# Patient Record
Sex: MALE | Race: WHITE | NOT HISPANIC OR LATINO | Employment: OTHER | URBAN - METROPOLITAN AREA
[De-identification: names, ages, dates, MRNs, and addresses within clinical notes are randomized per-mention and may not be internally consistent; named-entity substitution may affect disease eponyms.]

---

## 2019-02-05 ENCOUNTER — APPOINTMENT (OUTPATIENT)
Dept: RADIOLOGY | Facility: CLINIC | Age: 59
End: 2019-02-05
Payer: COMMERCIAL

## 2019-02-05 VITALS
WEIGHT: 250 LBS | HEIGHT: 73 IN | RESPIRATION RATE: 16 BRPM | DIASTOLIC BLOOD PRESSURE: 91 MMHG | HEART RATE: 99 BPM | SYSTOLIC BLOOD PRESSURE: 135 MMHG | BODY MASS INDEX: 33.13 KG/M2

## 2019-02-05 DIAGNOSIS — S82.141A CLOSED FRACTURE OF RIGHT TIBIAL PLATEAU, INITIAL ENCOUNTER: Primary | ICD-10-CM

## 2019-02-05 DIAGNOSIS — M25.561 ACUTE PAIN OF RIGHT KNEE: ICD-10-CM

## 2019-02-05 PROCEDURE — 99204 OFFICE O/P NEW MOD 45 MIN: CPT | Performed by: FAMILY MEDICINE

## 2019-02-05 PROCEDURE — 73564 X-RAY EXAM KNEE 4 OR MORE: CPT

## 2019-02-05 RX ORDER — ASPIRIN 325 MG
325 TABLET ORAL 2 TIMES DAILY
COMMUNITY
End: 2019-02-11 | Stop reason: ALTCHOICE

## 2019-02-05 RX ORDER — TRAMADOL HYDROCHLORIDE 50 MG/1
50 TABLET ORAL EVERY 8 HOURS PRN
Qty: 15 TABLET | Refills: 0 | Status: SHIPPED | OUTPATIENT
Start: 2019-02-05 | End: 2019-05-09 | Stop reason: ALTCHOICE

## 2019-02-05 RX ORDER — IBUPROFEN 800 MG/1
800 TABLET ORAL EVERY 8 HOURS PRN
Qty: 30 TABLET | Refills: 0 | Status: ON HOLD | OUTPATIENT
Start: 2019-02-05 | End: 2019-02-15 | Stop reason: SDUPTHER

## 2019-02-05 NOTE — PROGRESS NOTES
Assessment/Plan:  Assessment/Plan   Diagnoses and all orders for this visit:    Closed fracture of right tibial plateau, initial encounter  -     XR knee 4+ vw right injury; Future  -     CT knee right wo contrast; Future  -     Ambulatory referral to Orthopedic Surgery; Future  -     ibuprofen (MOTRIN) 800 mg tablet; Take 1 tablet (800 mg total) by mouth every 8 (eight) hours as needed for mild pain  -     traMADol (ULTRAM) 50 mg tablet; Take 1 tablet (50 mg total) by mouth every 8 (eight) hours as needed for moderate pain  -     T-Rom  Post Op Knee Brace    Other orders  -     aspirin 325 mg tablet; Take 325 mg by mouth 2 (two) times a day     50-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 left knee pain and swelling following injury while skiing on 02/05/2019  Discussed with patient physical exam, radiographs, impression and plan  X-rays of right knee noted for lateral tibial plateau split fracture with appearance of Schatzker Type I morphology with prominent gapping  Physical exam is noted for swelling about the knee as well as presence of fusion  There is tenderness of the lateral tibial plateau and lateral joint line of the knee  He has range of motion limited to extension of -20 degrees and flexion to 90°  I discussed with patient that given the extent of gapping in the knee, I would recommend evaluation and treatment by orthopedic surgeon  At this time I will place him in T ROM knee brace locked to -30 degrees extension, and he is advised to remain completely nonweightbearing  I will refer him for CT scan of the right knee to more fully determine the extent and morphology of the fracture  I will also have him follow up with orthopedic surgeon for further evaluation and treatment  He is advised to keep the leg elevated and continue with icing and alternate between ibuprofen 800 mg and tramadol for pain  Subjective:   Patient ID: Raoy Squires is a 63 y o  male    Chief Complaint   Patient presents with   [facility] Right Knee - Pain, Swelling, Numbness       45-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 presents for evaluation of right knee pain and swelling after sustaining injury while skiing 02/05/2019  He states that he lost control and process twisted his knee and landed on the anterior aspect  He had pain that was described as sudden onset, localized to the lateral aspect knee, throbbing, nonradiating, constant, and worse with bearing weight  He was assisted by another skier to his feet, and he is able to continue to ski down the rest of the slope putting most of his weight on his left lower extremity  He went home and overnight elevated lower extremity, rested, and applied ice to the knee  He states that overnight pain significantly worsened, swelling developed, and he had been unable to bear weight due to the pain and also had difficulty with fully extending and bending the knee, and pain that is worse with laying in bed and twisting  He has been using crutches from which he has had at home  He denies any previous significant injury to the right knee  Knee Pain   This is a new problem  The current episode started yesterday  The problem occurs constantly  The problem has been unchanged  Associated symptoms include arthralgias and joint swelling  Pertinent negatives include no abdominal pain, chest pain, chills, fever, numbness, rash, sore throat or weakness  The symptoms are aggravated by standing, twisting and walking  He has tried rest and ice for the symptoms  The treatment provided mild relief  The following portions of the patient's history were reviewed and updated as appropriate: He  has no past medical history on file  He  has a past surgical history that includes No past surgeries  His family history includes Cancer in his father; Kidney failure in his father; No Known Problems in his mother and sister  He  reports that he has been smoking Cigarettes  He has been smoking about 1 00 pack per day   He has never used smokeless tobacco  He reports that he drinks about 14 4 oz of alcohol per week   He reports that he does not use drugs  He has No Known Allergies       Review of Systems   Constitutional: Negative for chills and fever  HENT: Negative for sore throat  Eyes: Negative for visual disturbance  Respiratory: Negative for shortness of breath  Cardiovascular: Negative for chest pain  Gastrointestinal: Negative for abdominal pain  Genitourinary: Negative for flank pain  Musculoskeletal: Positive for arthralgias and joint swelling  Skin: Negative for rash and wound  Neurological: Negative for weakness and numbness  Hematological: Does not bruise/bleed easily  Psychiatric/Behavioral: Negative for self-injury  Objective:  Vitals:    02/05/19 1533   BP: 135/91   BP Location: Left arm   Patient Position: Sitting   Cuff Size: Large   Pulse: 99   Resp: 16   Weight: 113 kg (250 lb)   Height: 6' 1" (1 854 m)     Right Knee Exam     Tenderness   The patient is experiencing tenderness in the lateral joint line and medial joint line (Lateral tibial plateau)  Range of Motion   Extension: -30   Flexion: 90     Muscle Strength     The patient has normal right knee strength  Other   Other tests: effusion present          Observations     Right Knee   Positive for effusion  Physical Exam   Constitutional: He is oriented to person, place, and time  He appears well-developed  No distress  HENT:   Head: Normocephalic and atraumatic  Eyes: Conjunctivae are normal    Neck: No tracheal deviation present  Cardiovascular: Normal rate  Pulmonary/Chest: Effort normal  No respiratory distress  Abdominal: He exhibits no distension  Musculoskeletal:        Right knee: He exhibits effusion  Neurological: He is alert and oriented to person, place, and time  Skin: Skin is warm and dry  Psychiatric: He has a normal mood and affect   His behavior is normal    Nursing note and vitals reviewed  I have personally reviewed pertinent films in PACS and my interpretation is Right knee lateral tibial plateau split fracture with prominent gapping

## 2019-02-08 ENCOUNTER — HOSPITAL ENCOUNTER (OUTPATIENT)
Dept: CT IMAGING | Facility: HOSPITAL | Age: 59
Discharge: HOME | End: 2019-02-08
Payer: COMMERCIAL

## 2019-02-08 DIAGNOSIS — S82.141A CLOSED FRACTURE OF RIGHT TIBIAL PLATEAU, INITIAL ENCOUNTER: ICD-10-CM

## 2019-02-08 PROCEDURE — 73700 CT LOWER EXTREMITY W/O DYE: CPT

## 2019-02-11 ENCOUNTER — ANESTHESIA EVENT (OUTPATIENT)
Dept: PERIOP | Facility: HOSPITAL | Age: 59
DRG: 494 | End: 2019-02-11
Payer: COMMERCIAL

## 2019-02-11 VITALS
DIASTOLIC BLOOD PRESSURE: 84 MMHG | SYSTOLIC BLOOD PRESSURE: 134 MMHG | TEMPERATURE: 99.9 F | WEIGHT: 252 LBS | HEIGHT: 73 IN | RESPIRATION RATE: 16 BRPM | BODY MASS INDEX: 33.4 KG/M2 | HEART RATE: 102 BPM

## 2019-02-11 DIAGNOSIS — S82.141A CLOSED FRACTURE OF RIGHT TIBIAL PLATEAU, INITIAL ENCOUNTER: ICD-10-CM

## 2019-02-11 PROCEDURE — 99214 OFFICE O/P EST MOD 30 MIN: CPT | Performed by: ORTHOPAEDIC SURGERY

## 2019-02-11 RX ORDER — ACETAMINOPHEN 325 MG/1
650 TABLET ORAL EVERY 6 HOURS PRN
COMMUNITY
End: 2019-05-09 | Stop reason: ALTCHOICE

## 2019-02-11 NOTE — PROGRESS NOTES
Assessment:       closed , displaced tibial plateau fracture right proximal tibia      Plan:       I discussed the risks, benefits, options and alternatives of treatment  After a thorough discussion and, after all his questions were answered, the plan has been to proceed with surgical fixation of his tibial plateau fracture  He has requested that surgery be delayed until 02/14/2019 secondary to the impending bad weather tomorrow  I believe this is reasonable  He will be maintained in the hospital as an extended outpatient for perioperative antibiotics and analgesics  In the interim, he is to utilize the brace, which he has not been wearing for several days as he finds it in convenience  However, precautions were reviewed in regards to aggravating or worsening his injury without the use of the brace  He is to avoid weight-bearing  He was encouraged to contact me if questions or concerns arise  He is to stop taking anti-inflammatory medications, utilizing the tramadol and Tylenol for pain relief prior to surgery  Preoperative testing has been ordered as indicated  Follow up: 1 week  postoperatively        Subjective: Charlotte Medina is a 63 y o  male referred by Dr Kerri Bower for evaluation and treatment of right knee pain  Shashi injured his knee while skiing at [place] on 02/04/2019  He caught an edge, spun, was heading up hill and lost his balance, falling forward  He recalls hearing and feeling a popping sensation  He was seen, x-rays obtained and he was placed into a brace  He has not been wearing the brace because he finds it inconvenient and uncomfortable and does not provide significant benefit  He denies any history of prior injuries to his right knee that he can recall  He denies paresthesias  He has noted decrease in his swelling  He continues to have pain, but denies any significant pain  He denies any additional injuries    A CT scan was obtained and the patient is now being seen for orthopedic evaluation and treatment  Outside reports reviewed: none  The following portions of the patient's history were reviewed and updated as appropriate: allergies, current medications, past family history, past medical history, past social history, past surgical history and problem list       Objective:         General :   alert and oriented, in no acute distress   Gait: Utilizing crutches distance  The patient cannot bear weight on the injured extremity  Right Lower Extremity  Hip Palpation:  No tenderness over the greater  trochanter   Hip ROM:    Knee Effusion:  2+   Ecchymosis:  none   Knee ROM:  Right knee range of motion was possible from approximately 5° to 06°, with pain elicited by range of motion; left knee range of motion without restriction   Patella:  Nontender and without deformity   Tenderness: Lateral aspect of proximal tibia without tenderness medially   Stability:  Unable to assess stability secondary to the patient's injury, apprehension and pain   Jazmine Test:     Vikas's Test:     Sensation:   intact to light touch   Pulses: normal DP and PT pulses   The bilateral upper extremity examination and the left lower extremity examination were benign, with full range of motion, no tenderness and no deformities  HEENT exam is benign  Heart is regular  Lungs are clear  The abdomen is soft and nontender  The skin is without lacerations, abrasions or blistering, including about the right knee  The cervical, thoracic and lumbar spines are nontender  The motor and sensory exams are grossly intact throughout all 4 extremities, limited only by the patient's injury right lower extremity  Imaging  X-rays: 4 views of the knee demonstrate a lateral tibial plateau fracture, displaced and with impaction  A CT scan of the knee confirms a tibial plateau fracture with comminution and depressed articular surface  Tayler Drake

## 2019-02-11 NOTE — PRE-PROCEDURE INSTRUCTIONS
Pre-Surgery Instructions:   Medication Instructions    acetaminophen (TYLENOL) 325 mg tablet Instructed patient per Anesthesia Guidelines   ibuprofen (MOTRIN) 800 mg tablet last dose of Ibuprofen was 1/11 am; instructed not to take any preop by Dr Jhon Galan    traMADol Adaline Halt) 50 mg tablet Patient was instructed by Physician and understands  Pt was instructed by Dr Jhon Galan that he may take this  My Surgical Experience    The following information was developed to assist you to prepare for your operation  What do I need to do before coming to the hospital?   Arrange for a responsible person to drive you to and from the hospital    Arrange care for your children at home  Children are not allowed in the recovery areas of the hospital  Plan to wear clothing that is easy to put on and take off; loose fitting pants  DO NOT SMOKE DOS  Bathing  o Shower the evening before and the morning of your surgery with Dial soap per Dr Jhon Galan  Wear clean pajamas, sheets and cloths  o Remove all body piercing and jewelry  Food  o Nothing to eat or drink after midnight the night before your surgery  This includes candy and chewing gum  o Do not drink alcohol 24 hours before surgery  Medicine  o Follow instructions you received from your surgeon about which medicines you may take on the day of surgery  o If instructed to take medicine on the morning of surgery, take pills with just a small sip of water  Call your prescribing doctor for specific infroamtion on what to do if you take insulin    What should I bring to the hospital?    Bring:  Sherry Mcelroy or a walker, if you have them, for foot or knee surgery   A list of the daily medicines, vitamins, minerals, herbals and nutritional supplements you take   Include the dosages of medicines and the time you take them each day   Glasses, dentures or hearing aids   Minimal clothing; you will be wearing hospital sleepwear   Photo ID; required to verify your identity    Do not bring   Medicines or inhalers   Money, valuables or jewelry    What other information should I know about the day of surgery?  Notify your surgeons if you develop a cold, sore throat, cough, fever, rash or any other illness   Report to the Ambulatory Surgical/Same Day Surgery Unit   You will be instructed to stop at Registration only if you have not been pre-registered   Inform your  fi they do not stay that they will be asked by the staff to leave a phone number where they can be reached   Be available to be reached before surgery  In the event the operating room schedule changes, you may be asked to come in earlier or later than expected    *It is important to tell your doctor and others involved in your health care if you are taking or have been taking any non-prescription drugs, vitamins, minerals, herbals or other nutritional supplements   Any of these may interact with some food or medicines and cause a reaction

## 2019-02-11 NOTE — PATIENT INSTRUCTIONS
I have discussed the treatment options  The plan will be to proceed with surgical fixation, to be performed on 02/14/2019 at his request   He will be seen 1 week postoperatively in the office  Precautions have been reviewed prior to surgical intervention  He is also aware that recovery will take 3-6 months and there will be significant limitations postoperatively

## 2019-02-14 ENCOUNTER — ANESTHESIA (OUTPATIENT)
Dept: PERIOP | Facility: HOSPITAL | Age: 59
DRG: 494 | End: 2019-02-14
Payer: COMMERCIAL

## 2019-02-14 ENCOUNTER — HOSPITAL ENCOUNTER (INPATIENT)
Facility: HOSPITAL | Age: 59
LOS: 1 days | Discharge: HOME | DRG: 494 | End: 2019-02-15
Attending: ORTHOPAEDIC SURGERY | Admitting: ORTHOPAEDIC SURGERY
Payer: COMMERCIAL

## 2019-02-14 ENCOUNTER — APPOINTMENT (OUTPATIENT)
Dept: RADIOLOGY | Facility: HOSPITAL | Age: 59
DRG: 494 | End: 2019-02-14
Payer: COMMERCIAL

## 2019-02-14 DIAGNOSIS — S82.141D CLOSED FRACTURE OF RIGHT TIBIAL PLATEAU WITH ROUTINE HEALING: Primary | ICD-10-CM

## 2019-02-14 PROCEDURE — 97167 OT EVAL HIGH COMPLEX 60 MIN: CPT

## 2019-02-14 PROCEDURE — C1713 ANCHOR/SCREW BN/BN,TIS/BN: HCPCS | Performed by: ORTHOPAEDIC SURGERY

## 2019-02-14 PROCEDURE — 97116 GAIT TRAINING THERAPY: CPT

## 2019-02-14 PROCEDURE — 3E02340 INTRODUCTION OF INFLUENZA VACCINE INTO MUSCLE, PERCUTANEOUS APPROACH: ICD-10-PCS | Performed by: ORTHOPAEDIC SURGERY

## 2019-02-14 PROCEDURE — G8987 SELF CARE CURRENT STATUS: HCPCS

## 2019-02-14 PROCEDURE — G8979 MOBILITY GOAL STATUS: HCPCS

## 2019-02-14 PROCEDURE — 27535 TREAT KNEE FRACTURE: CPT | Performed by: ORTHOPAEDIC SURGERY

## 2019-02-14 PROCEDURE — 27535 TREAT KNEE FRACTURE: CPT | Performed by: PHYSICIAN ASSISTANT

## 2019-02-14 PROCEDURE — C1781 MESH (IMPLANTABLE): HCPCS | Performed by: ORTHOPAEDIC SURGERY

## 2019-02-14 PROCEDURE — 73560 X-RAY EXAM OF KNEE 1 OR 2: CPT

## 2019-02-14 PROCEDURE — G8978 MOBILITY CURRENT STATUS: HCPCS

## 2019-02-14 PROCEDURE — 97162 PT EVAL MOD COMPLEX 30 MIN: CPT

## 2019-02-14 PROCEDURE — 0QSG04Z REPOSITION RIGHT TIBIA WITH INTERNAL FIXATION DEVICE, OPEN APPROACH: ICD-10-PCS | Performed by: ORTHOPAEDIC SURGERY

## 2019-02-14 PROCEDURE — G8988 SELF CARE GOAL STATUS: HCPCS

## 2019-02-14 DEVICE — 3.5MM CORTEX SCREW SELF-TAPPING 30MM: Type: IMPLANTABLE DEVICE | Site: KNEE | Status: FUNCTIONAL

## 2019-02-14 DEVICE — 3.5MM CORTEX SCREW SELF-TAPPING 28MM: Type: IMPLANTABLE DEVICE | Site: KNEE | Status: FUNCTIONAL

## 2019-02-14 DEVICE — IMPLANTABLE DEVICE: Type: IMPLANTABLE DEVICE | Site: KNEE | Status: FUNCTIONAL

## 2019-02-14 DEVICE — 3.5MM VARIABLE ANGLE LOCKING SCREW/SLF-TPNG/STRDRV/85MM: Type: IMPLANTABLE DEVICE | Status: FUNCTIONAL

## 2019-02-14 DEVICE — 3.5MM VARIABLE ANGLE LOCKING SCREW/SLF-TPNG/STRDRV/80MM: Type: IMPLANTABLE DEVICE | Status: FUNCTIONAL

## 2019-02-14 DEVICE — 3.5MM CORTEX SCREW SELF-TAPPING 34MM: Type: IMPLANTABLE DEVICE | Site: KNEE | Status: FUNCTIONAL

## 2019-02-14 DEVICE — 3.5MM VA-LCP PROX TIBIA PLATE SMALL BEND/6H/117MM/RIGHT
Type: IMPLANTABLE DEVICE | Site: KNEE | Status: FUNCTIONAL
Brand: VA-LCP

## 2019-02-14 DEVICE — DBX PUTTY, 5CC
Type: IMPLANTABLE DEVICE | Site: KNEE | Status: FUNCTIONAL
Brand: DBX®

## 2019-02-14 RX ORDER — ACETAMINOPHEN 325 MG/1
650 TABLET ORAL EVERY 6 HOURS PRN
Status: DISCONTINUED | OUTPATIENT
Start: 2019-02-14 | End: 2019-02-15 | Stop reason: HOSPADM

## 2019-02-14 RX ORDER — KETOROLAC TROMETHAMINE 30 MG/ML
30 INJECTION, SOLUTION INTRAMUSCULAR; INTRAVENOUS ONCE AS NEEDED
Status: DISCONTINUED | OUTPATIENT
Start: 2019-02-14 | End: 2019-02-14 | Stop reason: HOSPADM

## 2019-02-14 RX ORDER — SODIUM CHLORIDE, SODIUM LACTATE, POTASSIUM CHLORIDE, CALCIUM CHLORIDE 600; 310; 30; 20 MG/100ML; MG/100ML; MG/100ML; MG/100ML
100 INJECTION, SOLUTION INTRAVENOUS CONTINUOUS
Status: DISCONTINUED | OUTPATIENT
Start: 2019-02-14 | End: 2019-02-14

## 2019-02-14 RX ORDER — ASPIRIN 325 MG
325 TABLET ORAL 2 TIMES DAILY
Status: DISCONTINUED | OUTPATIENT
Start: 2019-02-14 | End: 2019-02-15 | Stop reason: HOSPADM

## 2019-02-14 RX ORDER — SODIUM CHLORIDE, SODIUM LACTATE, POTASSIUM CHLORIDE, CALCIUM CHLORIDE 600; 310; 30; 20 MG/100ML; MG/100ML; MG/100ML; MG/100ML
INJECTION, SOLUTION INTRAVENOUS CONTINUOUS PRN
Status: DISCONTINUED | OUTPATIENT
Start: 2019-02-14 | End: 2019-02-14

## 2019-02-14 RX ORDER — ONDANSETRON 2 MG/ML
4 INJECTION INTRAMUSCULAR; INTRAVENOUS EVERY 8 HOURS PRN
Status: DISCONTINUED | OUTPATIENT
Start: 2019-02-14 | End: 2019-02-15 | Stop reason: HOSPADM

## 2019-02-14 RX ORDER — SODIUM CHLORIDE, SODIUM LACTATE, POTASSIUM CHLORIDE, CALCIUM CHLORIDE 600; 310; 30; 20 MG/100ML; MG/100ML; MG/100ML; MG/100ML
125 INJECTION, SOLUTION INTRAVENOUS CONTINUOUS
Status: DISCONTINUED | OUTPATIENT
Start: 2019-02-14 | End: 2019-02-15 | Stop reason: HOSPADM

## 2019-02-14 RX ORDER — ROPIVACAINE HYDROCHLORIDE 5 MG/ML
INJECTION, SOLUTION EPIDURAL; INFILTRATION; PERINEURAL
Status: COMPLETED | OUTPATIENT
Start: 2019-02-14 | End: 2019-02-14

## 2019-02-14 RX ORDER — METOCLOPRAMIDE HYDROCHLORIDE 5 MG/ML
10 INJECTION INTRAMUSCULAR; INTRAVENOUS ONCE AS NEEDED
Status: DISCONTINUED | OUTPATIENT
Start: 2019-02-14 | End: 2019-02-14 | Stop reason: HOSPADM

## 2019-02-14 RX ORDER — PROPOFOL 10 MG/ML
INJECTION, EMULSION INTRAVENOUS AS NEEDED
Status: DISCONTINUED | OUTPATIENT
Start: 2019-02-14 | End: 2019-02-14 | Stop reason: SURG

## 2019-02-14 RX ORDER — ROPIVACAINE HYDROCHLORIDE 5 MG/ML
INJECTION, SOLUTION EPIDURAL; INFILTRATION; PERINEURAL AS NEEDED
Status: DISCONTINUED | OUTPATIENT
Start: 2019-02-14 | End: 2019-02-14 | Stop reason: SURG

## 2019-02-14 RX ORDER — CEFAZOLIN SODIUM 2 G/50ML
SOLUTION INTRAVENOUS AS NEEDED
Status: DISCONTINUED | OUTPATIENT
Start: 2019-02-14 | End: 2019-02-14 | Stop reason: SURG

## 2019-02-14 RX ORDER — ONDANSETRON 2 MG/ML
4 INJECTION INTRAMUSCULAR; INTRAVENOUS ONCE AS NEEDED
Status: DISCONTINUED | OUTPATIENT
Start: 2019-02-14 | End: 2019-02-14 | Stop reason: HOSPADM

## 2019-02-14 RX ORDER — TRAMADOL HYDROCHLORIDE 50 MG/1
100 TABLET ORAL EVERY 6 HOURS SCHEDULED
Status: DISCONTINUED | OUTPATIENT
Start: 2019-02-14 | End: 2019-02-15 | Stop reason: HOSPADM

## 2019-02-14 RX ORDER — MIDAZOLAM HYDROCHLORIDE 1 MG/ML
INJECTION INTRAMUSCULAR; INTRAVENOUS AS NEEDED
Status: DISCONTINUED | OUTPATIENT
Start: 2019-02-14 | End: 2019-02-14 | Stop reason: SURG

## 2019-02-14 RX ORDER — HYDROMORPHONE HCL/PF 1 MG/ML
0.5 SYRINGE (ML) INJECTION
Status: DISCONTINUED | OUTPATIENT
Start: 2019-02-14 | End: 2019-02-14 | Stop reason: HOSPADM

## 2019-02-14 RX ORDER — DOCUSATE SODIUM 100 MG/1
100 CAPSULE, LIQUID FILLED ORAL 2 TIMES DAILY
Status: DISCONTINUED | OUTPATIENT
Start: 2019-02-14 | End: 2019-02-15 | Stop reason: HOSPADM

## 2019-02-14 RX ORDER — KETOROLAC TROMETHAMINE 30 MG/ML
INJECTION, SOLUTION INTRAMUSCULAR; INTRAVENOUS AS NEEDED
Status: DISCONTINUED | OUTPATIENT
Start: 2019-02-14 | End: 2019-02-14 | Stop reason: SURG

## 2019-02-14 RX ORDER — CEFAZOLIN SODIUM 2 G/50ML
2000 SOLUTION INTRAVENOUS EVERY 8 HOURS
Status: COMPLETED | OUTPATIENT
Start: 2019-02-14 | End: 2019-02-15

## 2019-02-14 RX ORDER — MORPHINE SULFATE 4 MG/ML
2 INJECTION, SOLUTION INTRAMUSCULAR; INTRAVENOUS
Status: DISCONTINUED | OUTPATIENT
Start: 2019-02-14 | End: 2019-02-14 | Stop reason: HOSPADM

## 2019-02-14 RX ORDER — FENTANYL CITRATE 50 UG/ML
INJECTION, SOLUTION INTRAMUSCULAR; INTRAVENOUS AS NEEDED
Status: DISCONTINUED | OUTPATIENT
Start: 2019-02-14 | End: 2019-02-14 | Stop reason: SURG

## 2019-02-14 RX ORDER — TRAMADOL HYDROCHLORIDE 50 MG/1
50 TABLET ORAL EVERY 8 HOURS PRN
Status: DISCONTINUED | OUTPATIENT
Start: 2019-02-14 | End: 2019-02-15 | Stop reason: HOSPADM

## 2019-02-14 RX ORDER — MEPERIDINE HYDROCHLORIDE 50 MG/ML
12.5 INJECTION INTRAMUSCULAR; INTRAVENOUS; SUBCUTANEOUS
Status: DISCONTINUED | OUTPATIENT
Start: 2019-02-14 | End: 2019-02-14 | Stop reason: HOSPADM

## 2019-02-14 RX ADMIN — HYDROMORPHONE HYDROCHLORIDE 0.5 MG: 1 INJECTION, SOLUTION INTRAMUSCULAR; INTRAVENOUS; SUBCUTANEOUS at 11:18

## 2019-02-14 RX ADMIN — MIDAZOLAM HYDROCHLORIDE 2 MG: 1 INJECTION, SOLUTION INTRAMUSCULAR; INTRAVENOUS at 07:53

## 2019-02-14 RX ADMIN — SODIUM CHLORIDE, POTASSIUM CHLORIDE, SODIUM LACTATE AND CALCIUM CHLORIDE 100 ML/HR: 600; 310; 30; 20 INJECTION, SOLUTION INTRAVENOUS at 12:52

## 2019-02-14 RX ADMIN — TRAMADOL HYDROCHLORIDE 100 MG: 50 TABLET, COATED ORAL at 17:38

## 2019-02-14 RX ADMIN — TRAMADOL HYDROCHLORIDE 100 MG: 50 TABLET, COATED ORAL at 23:38

## 2019-02-14 RX ADMIN — LIDOCAINE HYDROCHLORIDE 100 MG: 20 INJECTION, SOLUTION INTRAVENOUS at 08:41

## 2019-02-14 RX ADMIN — DEXAMETHASONE SODIUM PHOSPHATE 10 MG: 10 INJECTION INTRAMUSCULAR; INTRAVENOUS at 08:02

## 2019-02-14 RX ADMIN — CEFAZOLIN SODIUM 2000 MG: 2 SOLUTION INTRAVENOUS at 14:57

## 2019-02-14 RX ADMIN — ASPIRIN 325 MG: 325 TABLET, FILM COATED ORAL at 20:57

## 2019-02-14 RX ADMIN — TRAMADOL HYDROCHLORIDE 100 MG: 50 TABLET, COATED ORAL at 12:46

## 2019-02-14 RX ADMIN — DOCUSATE SODIUM 100 MG: 100 CAPSULE, LIQUID FILLED ORAL at 12:45

## 2019-02-14 RX ADMIN — FENTANYL CITRATE 50 MCG: 50 INJECTION INTRAMUSCULAR; INTRAVENOUS at 09:00

## 2019-02-14 RX ADMIN — SODIUM CHLORIDE, POTASSIUM CHLORIDE, SODIUM LACTATE AND CALCIUM CHLORIDE: 600; 310; 30; 20 INJECTION, SOLUTION INTRAVENOUS at 09:10

## 2019-02-14 RX ADMIN — FENTANYL CITRATE 50 MCG: 50 INJECTION INTRAMUSCULAR; INTRAVENOUS at 09:07

## 2019-02-14 RX ADMIN — ROPIVACAINE HYDROCHLORIDE 20 ML: 5 INJECTION, SOLUTION EPIDURAL; INFILTRATION; PERINEURAL at 08:02

## 2019-02-14 RX ADMIN — SODIUM CHLORIDE, POTASSIUM CHLORIDE, SODIUM LACTATE AND CALCIUM CHLORIDE 125 ML/HR: 600; 310; 30; 20 INJECTION, SOLUTION INTRAVENOUS at 07:00

## 2019-02-14 RX ADMIN — CEFAZOLIN SODIUM 2000 MG: 2 SOLUTION INTRAVENOUS at 08:57

## 2019-02-14 RX ADMIN — HYDROMORPHONE HYDROCHLORIDE 0.5 MG: 1 INJECTION, SOLUTION INTRAMUSCULAR; INTRAVENOUS; SUBCUTANEOUS at 10:38

## 2019-02-14 RX ADMIN — PROPOFOL 200 MG: 10 INJECTION, EMULSION INTRAVENOUS at 08:41

## 2019-02-14 RX ADMIN — FENTANYL CITRATE 50 MCG: 50 INJECTION INTRAMUSCULAR; INTRAVENOUS at 07:53

## 2019-02-14 RX ADMIN — HYDROMORPHONE HYDROCHLORIDE 0.5 MG: 1 INJECTION, SOLUTION INTRAMUSCULAR; INTRAVENOUS; SUBCUTANEOUS at 10:55

## 2019-02-14 RX ADMIN — FENTANYL CITRATE 50 MCG: 50 INJECTION INTRAMUSCULAR; INTRAVENOUS at 07:57

## 2019-02-14 RX ADMIN — ASPIRIN 325 MG: 325 TABLET, FILM COATED ORAL at 12:46

## 2019-02-14 RX ADMIN — KETOROLAC TROMETHAMINE 30 MG: 30 INJECTION, SOLUTION INTRAMUSCULAR at 10:13

## 2019-02-14 RX ADMIN — CEFAZOLIN SODIUM 2000 MG: 2 SOLUTION INTRAVENOUS at 21:03

## 2019-02-14 NOTE — OP NOTE
OPERATIVE REPORT  PATIENT NAME: Farheen De Leon    :  1960  MRN: [id number]  Pt Location: [facility] OR ROOM 03    SURGERY DATE: 2019    Surgeon(s) and Role:     * Ralph Torres - Primary    Assist:   Vladimir Fiore    The PA was required for this procedure to assist in fracture manipulation, reduction of the fracture, retraction of soft tissues  There was no resident available for the procedure  Preop Diagnosis:  Closed fracture of right tibial plateau, initial encounter [S82 141A]    * No post-op diagnosis entered *    Post-Op Diagnosis Codes:     * Closed fracture of right tibial plateau, initial encounter [S82 141A]    Procedure(s) (LRB):  TIBAL PLATEAU OPEN REDUCTION W/ INTERNAL FIXATION (ORIF) (Right)    Specimen(s):  * No specimens in log *    Estimated Blood Loss:   Minimal    Anesthesia Type:   General with 80 Dr  Albino Maul block    Tourniquet Time:  82 minutes at 300 mm of mercury    Operative Indications:  Closed fracture of right tibial plateau, initial encounter Ramiro Biggs is a 81-year-old male who injured his right lower extremity skiing and was seen in the emergency room reveals evaluated, x-rays were obtained  He was then seen by Orthopedics, a CT scan of noted and the patient was seen by myself for surgical discussion and review of his x-rays and CT scan on 2019  The risks, benefits, options and alternatives of treatment were discussed it was elected to proceed with surgical fixation  Operative Findings: There was a slightly comminuted, depressed lateral tibial plateau fracture which was stably fixated in standard technique with good restoration of the articular surface alignment and stable fixation  Fluoroscopic images in AP and lateral plane, the conclusion the procedure, demonstrated good alignment and stable fixation      Complications:   None    Procedure and Technique:  The patient was administered adductor canal block in the holding area and then taken to the operating room  A general anesthetic was administered and a well-padded tourniquet was applied to the proximal right thigh  A preoperative time-out was performed, preoperative antibiotics administered and the right lower extremity prepped and draped in standard fashion  The limb was then elevated, exsanguinated with an Esmarch bandage and the tourniquet inflated to 300 mm of mercury  A midline incision was performed from the proximal portion of the patella distally to a portion on the anterior tibial crest distal to the fracture to allow adequate access  Sharp dissection was carried down through the skin and subcutaneous tissues  The lateral soft tissues were elevated to allow exposure of the anterior compartment muscles  These were divided from posterior to anterior at approximately the level of the joint line and then curved distally over the anterior aspect of the tibia and then the tibia exposed subperiosteally along the tibial shaft  Careful subperiosteal exposure of the proximal tibia was then performed in the fracture site identified, mobilized and cleansed of debris  The fracture was inspected and the articular fragment, noted on CT scan, was identified and then gently placed proximally  An 18 gauge spinal needle was inserted into the joint and the fracture then reduced, re-establishing a reasonable joint line  Guide pins were placed across the fracture to stabilize and then a 4 hole proximal lateral tibial plate was placed and secured in position with guide pins  The fracture was noted to be in good position and at this time, the guide pins in the plate were removed the fracture again mobilized and utilizing fluoroscopic guidance, the articular surfaces placed in reasonable position and then bone graft placed at the fracture site, supporting the articular surface    The plate was then repositioned, secured with guide pins and a bone-holding clamp utilized to secure the device and the fracture in place  Fluoroscopic images demonstrated good alignment with a slight depression of the articular surface but within no more than 2 mm of anatomic alignment  Additional attempts at correction were not successful and it was felt that this was an adequate position  The plate was initially secured with a distal cortical screw and then the subarticular screws placed in standard fashion utilizing locking screws  Fluoroscopic images demonstrated excellent fracture alignment  Two additional cortical screws were placed distally  AP and lateral fluoroscopic images demonstrated excellent fracture alignment and stable fixation  Wound was thoroughly irrigated with copious amounts of saline solution  The anterior compartment fascia reapproximated loosely with 0 Vicryl  Subcutaneous tissues were approximated with 2 O Vicryl and the skin secured with skin glue  Dressings were then applied consisting of dry Telfa, gauze and Webril  The tourniquet was deflated after total tourniquet time of 82 minutes  The knee was wrapped with Ace bandages and then a knee range of motion brace applied along range of motion from 30-70 degrees  Patient was then awakened from the general anesthetic and transferred to recovery room in stable and satisfactory postoperative condition     I was present for the entire procedure    Patient Disposition:  PACU     SIGNATURE: Christo Herzog  DATE: February 14, 2019  TIME: 10:35 AM

## 2019-02-14 NOTE — PLAN OF CARE
Problem: PHYSICAL THERAPY ADULT  Goal: Performs mobility at highest level of function for planned discharge setting  See evaluation for individualized goals  Description  Treatment/Interventions: Functional transfer training, Elevations, Therapeutic exercise, Gait training, Patient/family training, Equipment eval/education, Spoke to nursing, Spoke to case management  Equipment Recommended: Crutches(continue use of crutches)       See flowsheet documentation for full assessment, interventions and recommendations  Note:   Prognosis: Excellent  Problem List: Decreased strength, Impaired balance, Orthopedic restrictions, Pain, Decreased skin integrity  Assessment: Pt is 63 y o  male seen for PT evaluation on 2/14/2019 s/p admit to [address] [place] [address] on 2/14/2019 w/ Closed fracture of right tibial plateau with routine healing  Pt is POD #0 tibial plateau ORIF  PT consulted to assess pt's functional mobility and d/c needs  Order placed for PT eval and tx, w/ up as tolerated order, NWB R LE w/ hinged knee brace donned upon arrival  Performed at least 2 patient identifiers during session: Name and wristband  Comorbidities affecting pt's physical performance at time of assessment include: smoker, obesity  PTA, pt was independent w/ all functional mobility w/ no AD at baseline (recent use of crutches following skiing accident), ambulates unrestricted distances and all terrain, has 6 SARAH, lives w/ spouse in 1 level home and retired  Personal factors affecting pt at time of IE include: ambulating w/ assistive device and stairs to enter home  Please find objective findings from PT assessment regarding body systems outlined above with impairments and limitations including impaired balance, gait deviations, fall risk and orthopedic restrictions, as well as mobility assessment (need for supervision level of assist w/ all phases of mobility when usually ambulating independently)   The following objective measures performed on IE also reveal limitations: Barthel Index: 65/100  Pt's clinical presentation is currently evolving  Pt to benefit from continued PT tx to address deficits as defined above and maximize level of functional independent mobility and consistency  From PT/mobility standpoint, recommendation at time of d/c would be OP PT & home w/ family support, pending progress in order to facilitate return to PLOF  Barriers to Discharge: None     Recommendation: Outpatient PT, Home with family support     PT - OK to Discharge: No    See flowsheet documentation for full assessment

## 2019-02-14 NOTE — PLAN OF CARE
Pt admitted to the Med Surg floor A+Ox4, denies chest pain and SOB, palpable right pedal pulse, immobilizer and ice in place, extremity elevated  Pain is tolerable for the patient  Oriented to the room and the use of the call bell  Will continue to monitor

## 2019-02-14 NOTE — PHYSICAL THERAPY NOTE
Physical Therapy Evaluation     Patient's Name: Moni Broussard    Admitting Diagnosis  Closed fracture of right tibial plateau, initial encounter [S82 141A]    Problem List  Patient Active Problem List   Diagnosis    Closed fracture of right tibial plateau with routine healing       Past Medical History  Past Medical History:   Diagnosis Date    Fractures        Past Surgical History  Past Surgical History:   Procedure Laterality Date    NO PAST SURGERIES            02/14/19 1307   Note Type   Note type Eval/Treat   Pain Assessment   Pain Assessment No/denies pain  (at rest)   Pain Score No Pain   Home Living   Type of [address] [place] [address] One level;Performs ADLs on one level; Able to live on main level with bedroom/bathroom;Stairs to enter with rails  (6 SARAH w/ U/L HR)   Bathroom Shower/Tub Tub/shower unit   Bathroom Toilet Standard   Bathroom Equipment Hand-held shower   [address]  (only using axillary crutches since injury)   Prior Function   Level of [place] Independent with ADLs and functional mobility   Lives With Spouse   Receives Help From Family   ADL Assistance Independent   IADLs Independent   Falls in the last 6 months 1 to 4   Vocational Retired   Restrictions/Precautions   [place] Bearing Precautions Per Order Yes   RLE [place] Bearing Per Order NWB   Braces or Orthoses LE Braces  (hinged knee brace)   Other Precautions Multiple lines; Fall Risk;Pain;WBS   General   Family/Caregiver Present Yes  (spouse initially)   Cognition   Overall Cognitive Status WFL   Arousal/Participation Alert   Orientation Level Oriented X4   Memory Within functional limits   Following Commands Follows all commands and directions without difficulty   Comments pt agreeable to PT session   RUE Assessment   RUE Assessment WFL   LUE Assessment   LUE Assessment WFL   RLE Assessment   RLE Assessment X   Strength RLE   R Hip Flexion 3+/5   R Knee Extension   (unable to assess)   R Ankle Dorsiflexion 3/5   LLE Assessment   LLE Assessment WFL  (5/5)   Coordination   Movements are Fluid and Coordinated 1   Sensation WFL   Light Touch   RLE Light Touch Grossly intact   LLE Light Touch Grossly intact   Bed Mobility   Supine to Sit 6  Modified independent   Additional items HOB elevated; Bedrails   Sit to Supine 6  Modified independent   Additional items HOB elevated; Bedrails   Transfers   Sit to Stand 5  Supervision   Additional items Assist x 1; Increased time required   Stand to Sit 5  Supervision   Additional items Assist x 1;Verbal cues   Additional Comments pt compliant 100% of the time maintaining NWB on R LE   Ambulation/Elevation   Gait pattern Step to;Excessively slow  (hop to w/ L LE)   Gait Assistance 5  Supervision   Additional items Assist x 1;Verbal cues   Assistive Device Rolling walker   Distance 100'   Stair Management Assistance Not tested   Balance   Static Sitting Normal   Dynamic Sitting Normal   Static Standing Good   Dynamic Standing Good   Ambulatory Good  (w/ RW)   Endurance Deficit   Endurance Deficit No   Activity Tolerance   Activity Tolerance Patient tolerated treatment well   Medical Staff Made Aware pt w/ up as tolerated order   Nurse Made Aware Yes, RN Naz Mckeon verbalized pt appropriate for PT session, made aware of outcomes/recs   Assessment   Prognosis Excellent   Problem List Decreased strength; Impaired balance;Orthopedic restrictions;Pain;Decreased skin integrity   Assessment Pt is 63 y o  male seen for PT evaluation on 2/14/2019 s/p admit to [facility] on 2/14/2019 w/ Closed fracture of right tibial plateau with routine healing  Pt is POD #0 tibial plateau ORIF  PT consulted to assess pt's functional mobility and d/c needs  Order placed for PT eval and tx, w/ up as tolerated order, NWB R LE w/ hinged knee brace donned upon arrival  Performed at least 2 patient identifiers during session: Name and wristband   Comorbidities affecting pt's physical performance at time of assessment include: smoker, obesity  PTA, pt was independent w/ all functional mobility w/ no AD at baseline (recent use of crutches following skiing accident), ambulates unrestricted distances and all terrain, has 6 SARAH, lives w/ spouse in 1 level home and retired  Personal factors affecting pt at time of IE include: ambulating w/ assistive device and stairs to enter home  Please find objective findings from PT assessment regarding body systems outlined above with impairments and limitations including impaired balance, gait deviations, fall risk and orthopedic restrictions, as well as mobility assessment (need for supervision level of assist w/ all phases of mobility when usually ambulating independently)  The following objective measures performed on IE also reveal limitations: Barthel Index: 65/100  Pt's clinical presentation is currently evolving  Pt to benefit from continued PT tx to address deficits as defined above and maximize level of functional independent mobility and consistency  From PT/mobility standpoint, recommendation at time of d/c would be OP PT & home w/ family support, pending progress in order to facilitate return to PLOF  Barriers to Discharge None   Goals   Patient Goals "to return home"   [facility] Expiration Date 02/19/19   Short Term Goal #1 In 3-5 days: Increase R LE strength 1/2 grade to facilitate independent mobility, Perform all transfers independently to improve independence, Ambulate > 250 ft  with least restrictive assistive device modified independent w/o LOB and w/ normalized gait pattern 100% of the time, Navigate 6 stairs modified independent with unilateral handrail to facilitate return to previous living environment, Increase all balance 1/2 grade to decrease risk for falls and Tolerate 4 hr OOB to faciliate upright tolerance   Treatment Day 1   Plan   Treatment/Interventions Functional transfer training;Elevations; Therapeutic exercise;Gait training;Patient/family training;Equipment eval/education;Spoke to nursing;Spoke to case management   PT Frequency 7x/wk   Recommendation   Recommendation Outpatient PT; Home with family support   Equipment Recommended Crutches  (continue use of crutches)   PT - OK to Discharge No   Additional Comments pt to clear steps prior to d/c home   Barthel Index   Feeding 10   Bathing 5   Grooming Score 5   Dressing Score 5   Bladder Score 10   Bowels Score 10   Toilet Use Score 10   Transfers (Bed/Chair) Score 10   Mobility (Level Surface) Score 0   Stairs Score 0   Barthel Index Score 65     Physical Therapy Treatment Note  Time In: 1325  Time Out: 1334  Total Time: 9 min     S:  Pt agreeable to PT treatment session s/p PT eval, in no apparent distress, A&O x 4 and responsive      O:  Pt seen for PT treatment session this date with interventions consisting of gait training w/ emphasis on improving pt's ability to ambulate level surfaces x 60 ft with close S provided by therapist with axillary crutches  No pain reported throughout  VC required for technique  PT appropriately sized pt's own crutches from home for appropriate use  Pt demonstrated and verbalized understanding of use w/ crutches  1 minor lateral LOB which pt correctly adapted to neutral standing balance  Pt maintained NWB on R LE throughout mobility training      A:  Post session: pt returned BTB, all needs in reach and RN notified of session findings/recommendations     P:  Continue to recommend OP PT and home w/ family support at time of d/c in order to maximize pt's functional independence and safety w/ mobility  Pt continues to be functioning below baseline level, and remains limited 2* factors listed above  PT will continue to see pt while here in order to address the deficits listed above and provide interventions consistent w/ POC in effort to achieve STGs      Liz Serna PT

## 2019-02-14 NOTE — PLAN OF CARE
Problem: OCCUPATIONAL THERAPY ADULT  Goal: Performs self-care activities at highest level of function for planned discharge setting  See evaluation for individualized goals  Description  Treatment Interventions: Patient/family training, Energy conservation, Functional transfer training, ADL retraining          See flowsheet documentation for full assessment, interventions and recommendations  Note:   Limitation: Decreased high-level ADLs, Decreased self-care trans, Decreased ADL status  Prognosis: Good  Assessment: Pt is a 63 y o  male seen for OT evaluation s/p admit to [address] on 2/14/2019 w/ Closed fracture of right tibial plateau with routine healing  Comorbidities affecting pt's functional performance at time of assessment include: recent ORIF (this am) RLE  Personal factors affecting pt at time of IE include:steps to enter environment, difficulty performing ADLS and difficulty performing IADLS   Prior to admission, pt was I with all self care ADL's, IADL's, Ambulation and various leisure/outdoor activities  Upon evaluation: Pt requires mostly a supervision level of assistance with mobility and set up with self care r/t the following deficits impacting occupational performance: orthopedic restrictions  Pt to benefit from continued skilled OT tx while in the hospital to address deficits as defined above and maximize level of functional independence w ADL's and functional mobility  Occupational Performance areas to address include: bathing/shower, dressing and functional mobility  From OT standpoint, recommendation at time of d/c would be home with services as indicated  Out Pt therapy when indicated in the future          OT Discharge Recommendation: Home with family support(out patient therapy in the future)  OT - OK to Discharge:  Yes

## 2019-02-14 NOTE — OCCUPATIONAL THERAPY NOTE
Occupational Therapy Evaluation      Saul Cooperz    2/14/2019    Patient Active Problem List   Diagnosis    Closed fracture of right tibial plateau with routine healing       Past Medical History:   Diagnosis Date    Fractures        Past Surgical History:   Procedure Laterality Date    NO PAST SURGERIES          02/14/19 1338   Note Type   Note type Eval/Treat   Restrictions/Precautions   Weight Bearing Precautions Per Order Yes   RLE Weight Bearing Per Order NWB   Braces or Orthoses   (hinge knee brace)   Other Precautions Fall Risk;Multiple lines  (IV)   Pain Assessment   Pain Assessment 0-10   Pain Score 1   Pain Type Surgical pain  (minimal)   Pain Location Knee   Pain Orientation Right   Hospital Pain Intervention(s) Cold applied;Repositioned   Home Living   Type of [address] [place] [address] One level;Performs ADLs on one level;Stairs to enter with rails  (6 SARAH)   Bathroom Shower/Tub Tub/shower unit   Bathroom Toilet Standard   Bathroom Equipment Hand-held shower   [address]  (using since fx)   Prior Function   Level of [place] Independent with ADLs and functional mobility   Lives With Spouse   Receives Help From Family   ADL Assistance Independent   IADLs Independent   Falls in the last 6 months 1 to 4   Vocational Retired   Comments active PTA, various outdoor sports   Psychosocial   Psychosocial (WDL) WDL   Subjective   Subjective pt very willing to get OOB with therapies   ADL   Eating Assistance 7  Independent   Grooming Assistance 7  Independent   Grooming Deficit Setup   UB Bathing Assistance 5  [facility]  Unable to assess   [address] 5  [address] Unable to assess   Additional Comments pt in OR this am, RLE with brace/ace wrap   Bed Mobility   Rolling R 6  Modified independent   Additional items Bedrails   Rolling L 6  Modified independent   Additional items Bedrails   Supine to Sit 6  Modified independent   Additional items Bedrails;HOB elevated   Sit to Supine 6  Modified independent   Additional items Bedrails;HOB elevated   Transfers   Sit to Stand 5  Supervision   Additional items Assist x 1; Increased time required;Verbal cues; Bedrails   Stand to Sit 5  Supervision   Additional items Bedrails; Increased time required;Verbal cues; Assist x 1   Functional Mobility   Functional Mobility 5  Supervision   Additional items Rolling walker;Crutches  (walked with both, maintaining RLE NWB status)   Balance   Static Sitting Normal   Dynamic Sitting Normal   Static Standing Good   Dynamic Standing Good   Ambulatory Good   Activity Tolerance   Activity Tolerance Patient tolerated treatment well   Nurse Made Aware yes   RUE Assessment   RUE Assessment WFL   LUE Assessment   LUE Assessment WFL   Hand Function   Gross Motor Coordination Functional   Fine Motor Coordination Functional   Cognition   Overall Cognitive Status WFL   Arousal/Participation Alert; Cooperative   Attention Within functional limits   Orientation Level Oriented X4   Memory Within functional limits   Following Commands Follows all commands and directions without difficulty   Assessment   Limitation Decreased high-level ADLs; Decreased self-care trans;Decreased ADL status   Prognosis Good   Assessment Pt is a 63 y o  male seen for OT evaluation s/p admit to [facility] on 2/14/2019 w/ Closed fracture of right tibial plateau with routine healing  Comorbidities affecting pt's functional performance at time of assessment include: recent ORIF (this am) RLE  Personal factors affecting pt at time of IE include:steps to enter environment, difficulty performing ADLS and difficulty performing IADLS   Prior to admission, pt was I with all self care ADL's, IADL's, Ambulation and various leisure/outdoor activities   Upon evaluation: Pt requires mostly a supervision level of assistance with mobility and set up with self care r/t the following deficits impacting occupational performance: orthopedic restrictions  Pt to benefit from continued skilled OT tx while in the hospital to address deficits as defined above and maximize level of functional independence w ADL's and functional mobility  Occupational Performance areas to address include: bathing/shower, dressing and functional mobility  From OT standpoint, recommendation at time of d/c would be home with services as indicated  Out Pt therapy when indicated in the future        Goals   Patient Goals to go back home, hopefully tomorrow   STG Time Frame 3-5   Short Term Goal #1 increase knoiwrajni re: adaptations as indicated, for increased ability to participate safely in daily tasks   Short Term Goal #2 increase self toileting to MI with good safety demonstrated   Plan   Treatment Interventions Patient/family training;Energy conservation; Functional transfer training;ADL retraining   Goal Expiration Date 02/19/19   Treatment Day 0   OT Frequency 5x/wk   Recommendation   OT Discharge Recommendation Home with family support  (out patient therapy in the future)   OT - OK to Discharge Yes   Barthel Index   Feeding 10   Bathing 5   Grooming Score 5   Dressing Score 5   Bladder Score 10   Bowels Score 10   Toilet Use Score 5  (supervision)   Transfers (Bed/Chair) Score 10   Mobility (Level Surface) Score 0   Stairs Score 0   Barthel Index Score 60   Sydnie Burn, OT

## 2019-02-14 NOTE — ANESTHESIA POSTPROCEDURE EVALUATION
Post-Op Assessment Note    CV Status:  Stable  Pain Score: 5    Pain management: adequate     Mental Status:  Alert and awake   Hydration Status:  Euvolemic   PONV Controlled:  Controlled   Airway Patency:  Patent   Post Op Vitals Reviewed: Yes      Staff: Anesthesiologist           BP   132/74   Temp   98 9   Pulse  84   Resp 20   SpO2 96   Dilaudid given in PACU

## 2019-02-14 NOTE — ANESTHESIA PROCEDURE NOTES
Peripheral Block    Patient location during procedure: pre-op  Start time: 2/14/2019 8:02 AM  Reason for block: at surgeon's request and post-op pain management  Staffing  Anesthesiologist: Bita Hinson MD  Performed: anesthesiologist   Preanesthetic Checklist  Completed: patient identified, site marked, surgical consent, pre-op evaluation, timeout performed, IV checked, risks and benefits discussed and monitors and equipment checked  Peripheral Block  Patient position: supine  Prep: Betadine  Patient monitoring: heart rate, cardiac monitor, continuous pulse ox and frequent blood pressure checks  Block type: adductor canal block  Laterality: right  Injection technique: single-shot  Procedures: ultrasound guided  Ultrasound permanent image savedropivacaine (NAROPIN) 0 5 % perineural infiltration, 20 mL  Needle  Needle type: Stimuplex   Needle gauge: 21 G  Needle length: 10 cm  Needle localization: ultrasound guidance and nerve stimulator  Test dose: negative  Assessment  Injection assessment: incremental injection, local visualized surrounding nerve on ultrasound, negative aspiration for CSF, negative aspiration for heme and no paresthesia on injection  Paresthesia pain: none  Heart rate change: no  Slow fractionated injection: yes  Post-procedure:  site cleaned  patient tolerated the procedure well with no immediate complications  Additional Notes  Nerve Stimulator used: No twitch below 0 4 mAmp

## 2019-02-15 VITALS
DIASTOLIC BLOOD PRESSURE: 76 MMHG | SYSTOLIC BLOOD PRESSURE: 155 MMHG | RESPIRATION RATE: 16 BRPM | HEART RATE: 82 BPM | HEIGHT: 73 IN | BODY MASS INDEX: 33.13 KG/M2 | OXYGEN SATURATION: 96 % | WEIGHT: 250 LBS | TEMPERATURE: 98.6 F

## 2019-02-15 DIAGNOSIS — S82.141A CLOSED FRACTURE OF RIGHT TIBIAL PLATEAU, INITIAL ENCOUNTER: ICD-10-CM

## 2019-02-15 PROCEDURE — 90682 RIV4 VACC RECOMBINANT DNA IM: CPT | Performed by: ORTHOPAEDIC SURGERY

## 2019-02-15 PROCEDURE — 97116 GAIT TRAINING THERAPY: CPT

## 2019-02-15 RX ORDER — TRAMADOL HYDROCHLORIDE 50 MG/1
TABLET ORAL
Qty: 20 TABLET | Refills: 0 | Status: SHIPPED | OUTPATIENT
Start: 2019-02-15 | End: 2019-05-09 | Stop reason: ALTCHOICE

## 2019-02-15 RX ORDER — IBUPROFEN 800 MG/1
800 TABLET ORAL EVERY 8 HOURS PRN
Qty: 45 TABLET | Refills: 0 | Status: SHIPPED | OUTPATIENT
Start: 2019-02-15 | End: 2019-05-09 | Stop reason: ALTCHOICE

## 2019-02-15 RX ADMIN — TRAMADOL HYDROCHLORIDE 100 MG: 50 TABLET, COATED ORAL at 11:36

## 2019-02-15 RX ADMIN — TRAMADOL HYDROCHLORIDE 100 MG: 50 TABLET, COATED ORAL at 05:22

## 2019-02-15 RX ADMIN — SODIUM CHLORIDE, POTASSIUM CHLORIDE, SODIUM LACTATE AND CALCIUM CHLORIDE 125 ML/HR: 600; 310; 30; 20 INJECTION, SOLUTION INTRAVENOUS at 02:15

## 2019-02-15 RX ADMIN — CEFAZOLIN SODIUM 2000 MG: 2 SOLUTION INTRAVENOUS at 05:23

## 2019-02-15 RX ADMIN — INFLUENZA A VIRUS A/MICHIGAN/45/2015 (H1N1) RECOMBINANT HEMAGGLUTININ ANTIGEN, INFLUENZA A VIRUS A/SINGAPORE/INFIMH-16-0019/2016 (H3N2) RECOMBINANT HEMAGGLUTININ ANTIGEN, INFLUENZA B VIRUS B/MARYLAND/15/2016 RECOMBINANT HEMAGGLUTININ ANTIGEN, AND INFLUENZA B VIRUS B/PHUKET/3073/2013 RECOMBINANT HEMAGGLUTININ ANTIGEN 0.5 ML: 45; 45; 45; 45 INJECTION INTRAMUSCULAR at 11:37

## 2019-02-15 RX ADMIN — ASPIRIN 325 MG: 325 TABLET, FILM COATED ORAL at 10:17

## 2019-02-15 RX ADMIN — DOCUSATE SODIUM 100 MG: 100 CAPSULE, LIQUID FILLED ORAL at 10:17

## 2019-02-15 NOTE — SOCIAL WORK
CM met with pt at bedside and explained role  Pt lives with SO in a 1 story house;  6 SARAH  Pt reports he was completely independent prior to admission and did not use any DME  Pt does not have a local PCP  [facility] [employer] card provided for same  Pt uses [employer] on [employer]  Pt denies any history of HHC or SNF  He further denied any history of [facility] D&A treatment  OP PT recommended by PT after evaluation  Discussed same with Dr Anette Oquendo  He does not feel pt needs OP PT at this time  He will see him in his office for follow up and refer him to same as needed  Pt will go home with brace and crutches  Pt denies any other discharge needs  His SO will transport him home  CM to follow as needed  CM reviewed d/c planning process including the following: identifying help at home, patient preference for d/c planning needs, availability of treatment team to discuss questions or concerns patient and/or family may have regarding understanding medications and recognizing signs and symptoms once discharged  CM also encouraged patient to follow up with all recommended appointments after discharge  Patient advised of importance for patient and family to participate in managing patients medical well being

## 2019-02-15 NOTE — PHYSICAL THERAPY NOTE
02/15/19 0848   Pain Assessment   Pain Assessment 0-10   Pain Score No Pain   Restrictions/Precautions   Weight Bearing Precautions Per Order Yes   RLE Weight Bearing Per Order NWB   Braces or Orthoses   (R hinge knee brace)   Other Precautions Multiple lines; Fall Risk   General   Chart Reviewed Yes   Family/Caregiver Present No   Cognition   Overall Cognitive Status WFL   Arousal/Participation Alert; Cooperative   Attention Within functional limits   Orientation Level Oriented X4   Memory Within functional limits   Following Commands Follows all commands and directions without difficulty   Comments pt agrred to PT treatment   Subjective   Subjective "I really don't have any pain  I'm supposed to go home today "   Bed Mobility   Supine to Sit 6  Modified independent   Additional items HOB elevated; Bedrails   Transfers   Sit to Stand 5  Supervision   Additional items Assist x 1;HOB elevated; Bedrails;Verbal cues   Stand to Sit 5  Supervision   Additional items Assist x 1; Armrests; Increased time required;Verbal cues   Additional Comments pt able to maintain NWB % of session   Ambulation/Elevation   Gait pattern Step to  (hop to w/LLE)   Gait Assistance 5  Supervision   Additional items Assist x 1;Verbal cues   Assistive Device Axillary crutches   Distance 70 feet x 2   Stair Management Assistance   (CGA)   Additional items Assist x 2;Verbal cues   Stair Management Technique Step to pattern  (1 crutch, 1 rail)   Number of Stairs 8   Balance   Static Sitting Normal   Dynamic Sitting Normal   Static Standing Good   Dynamic Standing Good   Ambulatory Good   Endurance Deficit   Endurance Deficit No   Activity Tolerance   Activity Tolerance Patient tolerated treatment well   Exercises   Ankle Pumps Supine;20 reps;AROM; Bilateral   Assessment   Prognosis Excellent   Problem List Decreased strength; Impaired balance;Decreased skin integrity;Orthopedic restrictions   Assessment Pt seen for PT treatment session this date with interventions consisting of gait training w/ emphasis on improving pt's ability to ambulate level surfaces x 70 feet x 2 with close S provided by therapist with axillary crutches and stair training 8 steps CGA x 2 with 1 rail & 1 crutch  Pt agreeable to PT treatment session upon arrival, pt found supine in bed w/ HOB elevated, in no apparent distress  In comparison to previous session, pt with improvements in amb tolerance, stair training  Post session: all needs in reach, OOB in chair  Continue to recommend OP PT at time of d/c in order to maximize pt's functional independence and safety w/ mobility  Pt continues to be functioning below baseline level, and remains limited 2* factors listed above and including decreased independent functional mobility  PT will continue to see pt while here in order to address the deficits listed above and provide interventions consistent w/ POC in effort to achieve STGs  Barriers to Discharge None   Goals   Patient Goals to get home today   Treatment Day 2   Plan   Treatment/Interventions Functional transfer training;LE strengthening/ROM; Elevations; Therapeutic exercise;Patient/family training;Equipment eval/education;Spoke to nursing   Progress Progressing toward goals   PT Frequency 7x/wk   Recommendation   Recommendation Outpatient PT; Home with family support   Equipment Recommended Crutches  (continue use of crutches)   PT - OK to Discharge Yes  (when med cleared)   Additional Comments pt OOB in chair all needs in place post session   Crow Parsons, PTA

## 2019-02-15 NOTE — PLAN OF CARE
Problem: PHYSICAL THERAPY ADULT  Goal: Performs mobility at highest level of function for planned discharge setting  See evaluation for individualized goals  Description  Treatment/Interventions: Functional transfer training, Elevations, Therapeutic exercise, Gait training, Patient/family training, Equipment eval/education, Spoke to nursing, Spoke to case management  Equipment Recommended: Crutches(continue use of crutches)       See flowsheet documentation for full assessment, interventions and recommendations     Outcome: Progressing

## 2019-02-15 NOTE — PLAN OF CARE
Problem: DISCHARGE PLANNING - CARE MANAGEMENT  Goal: Discharge to post-acute care or home with appropriate resources  Description  INTERVENTIONS:  - Conduct assessment to determine patient/family and health care team treatment goals, and need for post-acute services based on payer coverage, community resources, and patient preferences, and barriers to discharge  - Address psychosocial, clinical, and financial barriers to discharge as identified in assessment in conjunction with the patient/family and health care team  - Arrange appropriate level of post-acute services according to patient?s   needs and preference and payer coverage in collaboration with the physician and health care team  - Communicate with and update the patient/family, physician, and health care team regarding progress on the discharge plan  - Arrange appropriate transportation to post-acute venues  - Patient's goal is to return home with spouse upon discharge   Outcome: Completed

## 2019-02-15 NOTE — NURSING NOTE
Pt discharged home, all belongings sent with the pt, discharge instructions given to pt, all questions answered  IV removed  Escorted to the lobby

## 2019-02-15 NOTE — OCCUPATIONAL THERAPY NOTE
Pt  Reports no need for OT treatment - "I've been doing this for 2 weeks already" (ADL's without WB of RLE)  Was awaiting discharge instructions, is going home today     KENTRELL Esquivel/DEANN

## 2019-02-15 NOTE — DISCHARGE SUMMARY
Discharge summary:    Admitting diagnosis:  Displaced, comminuted, depressed and intra-articular fracture proximal right tibia (tibial plateau)    Discharge diagnosis:  right tibial plateau fracture  Ambulatory dysfunction  Procedures:  ORIF right tibial plateau fracture 15/81/7338    Complications none    Discharge condition stable    Course in the hospital:  Alice Larson was admitted on 02/14/2019 after undergoing open reduction internal fixation of his right tibial plateau fracture  He was provided with perioperative antibiotics, analgesics and placed on aspirin for DVT prophylaxis  Physical therapy was ordered for ambulation training, nonweightbearing right lower extremity  He did well and was felt to be safe for discharge on the 1st postoperative day  Pain was well tolerated and he was using minimal doses of analgesic medication  Physical exam:  On the day of discharge, the dressings were clean, dry and intact  Motor and sensory exams are grossly intact  His brace was in place  Calf compartments were soft and nontender  He was actively dorsiflexing and plantar flexing his ankle and had good palpable pulses and color and capillary refill distally  Discharge instructions:  Patient was discharged home with follow-up in 1 week  He was to use analgesic medication as needed and instructions were reviewed  Prescriptions were forwarded to his pharmacy  He was to contact me if any questions or concerns were to arise  He was to minimize activity as instructed  Precautions were reviewed, instructions provided and questions answered

## 2019-02-15 NOTE — UTILIZATION REVIEW
Initial Clinical Review ELECTIVE INPATIENT SURGERY  Op 85865 no auth req per Jonah naqvi marianne  CPT code 56794 is an inpatient procedure  IP if required  Approved [id number]- 1 day stay    Age/Sex: 63 y o  male     Surgery Date: 2/14/19    Procedure: TIBAL PLATEAU OPEN REDUCTION W/ INTERNAL FIXATION (ORIF) (Right)  Operative Findings: There was a slightly comminuted, depressed lateral tibial plateau fracture which was stably fixated in standard technique with good restoration of the articular surface alignment and stable fixation  Fluoroscopic images in AP and lateral plane, the conclusion the procedure, demonstrated good alignment and stable fixation  Anesthesia: General with adductor canal block    Admission Orders: Date/Time/Statement: 2/14/19 @ 1111 MIQUEL Mendieta This Encounter   Procedures    Inpatient Admission     Standing Status:   Standing     Number of Occurrences:   1     Order Specific Question:   Admitting Physician     Answer:   Keara Marquez     Order Specific Question:   Level of Care     Answer:   Med Surg [16]     Order Specific Question:   Estimated length of stay     Answer:   Inpatient Only Surgery     Vital Signs:   02/15/19 0754 98 6 °F (37 °C) 82 16 155/76 96 % None (Room air)   02/15/19 0004 98 1 °F (36 7 °C) 87 18 135/82 95 % None (Room air)   02/14/19 1528 98 2 °F (36 8 °C) 95 18 165/76 93 % None (Room air)   02/14/19 1426 97 9 °F (36 6 °C) 94 18 176/92 93 % None (Room air)       Diet:        Diet Orders   (From admission, onward)            Start     Ordered    02/14/19 1144  Diet Regular; Regular House  Diet effective now     Question Answer Comment   Diet Type Regular    Regular Regular House    RD to adjust diet per protocol?  Yes        02/14/19 1143        Mobility:   NWB RLE  Elevate RLE    DVT Prophylaxis:   Foot pumps  aspirin tablet 325 mg   Dose: 325 mg  Freq: 2 times daily Route: PO    Pain Control:   Scheduled Meds:  Current Facility-Administered Medications:  acetaminophen 650 mg Oral Q6H PRN   aspirin 325 mg Oral BID   docusate sodium 100 mg Oral BID   lactated ringers 125 mL/hr Intravenous Continuous   ondansetron 4 mg Intravenous Q8H PRN   traMADol 100 mg Oral Q6H [address]   traMADol 50 mg Oral Q8H PRN       Network Utilization Review Department  Phone: [phone]; Fax [phone]  [email or web address]  ATTENTION: Please call with any questions or concerns to [phone]  and carefully listen to the prompts so that you are directed to the right person  Send all requests for admission clinical reviews, approved or denied determinations and any other requests to fax [phone]   All voicemails are confidential

## 2019-02-18 NOTE — UTILIZATION REVIEW
Notification of Discharge  This is a Notification of Discharge from our facility [address]  Please be advised that this patient has been discharge from our facility  Below you will find the admission and discharge date and time including the patients disposition  PRESENTATION DATE: 2/14/2019  6:26 AM  IP ADMISSION DATE: 2/14/19 1134  DISCHARGE DATE: 2/15/2019  2:30 PM  DISPOSITION: [facility] Utilization Review Department  Phone: [phone]; Fax [phone]  [email or web address]  ATTENTION: Please call with any questions or concerns to [phone]  and carefully listen to the prompts so that you are directed to the right person  Send all requests for admission clinical reviews, approved or denied determinations and any other requests to fax [phone]   All voicemails are confidential

## 2019-02-21 ENCOUNTER — APPOINTMENT (OUTPATIENT)
Dept: RADIOLOGY | Facility: CLINIC | Age: 59
End: 2019-02-21
Payer: COMMERCIAL

## 2019-02-21 ENCOUNTER — OFFICE VISIT (OUTPATIENT)
Dept: OBGYN CLINIC | Facility: CLINIC | Age: 59
End: 2019-02-21

## 2019-02-21 VITALS — TEMPERATURE: 98.6 F | DIASTOLIC BLOOD PRESSURE: 86 MMHG | HEART RATE: 96 BPM | SYSTOLIC BLOOD PRESSURE: 128 MMHG

## 2019-02-21 DIAGNOSIS — S82.141D CLOSED FRACTURE OF RIGHT TIBIAL PLATEAU WITH ROUTINE HEALING: Primary | ICD-10-CM

## 2019-02-21 DIAGNOSIS — S82.141D CLOSED FRACTURE OF RIGHT TIBIAL PLATEAU WITH ROUTINE HEALING: ICD-10-CM

## 2019-02-21 PROCEDURE — 99024 POSTOP FOLLOW-UP VISIT: CPT | Performed by: ORTHOPAEDIC SURGERY

## 2019-02-21 PROCEDURE — 73560 X-RAY EXAM OF KNEE 1 OR 2: CPT

## 2019-02-21 NOTE — UTILIZATION REVIEW
[address] [facility]  [address]  [place], [address]  [phone]     Tax ID: [id number]      NPI: [id number]        Sachin Harris   Physician   Orthopedics   Discharge Summary   Signed   Date of Service:  2/15/2019 12:56 PM            Discharge Summary              Show:Clear all  [x]Manual[]Template[]Copied    Added by:  [x]Paul Sneed      []Bud for details         Discharge summary:     Admitting diagnosis:  Displaced, comminuted, depressed and intra-articular fracture proximal right tibia (tibial plateau)     Discharge diagnosis:  right tibial plateau fracture  Ambulatory dysfunction      Procedures:  ORIF right tibial plateau fracture 10/87/8309     Complications none     Discharge condition stable     Course in the hospital:  Lazarus Slim was admitted on 02/14/2019 after undergoing open reduction internal fixation of his right tibial plateau fracture  He was provided with perioperative antibiotics, analgesics and placed on aspirin for DVT prophylaxis  Physical therapy was ordered for ambulation training, nonweightbearing right lower extremity  He did well and was felt to be safe for discharge on the 1st postoperative day  Pain was well tolerated and he was using minimal doses of analgesic medication      Physical exam:  On the day of discharge, the dressings were clean, dry and intact  Motor and sensory exams are grossly intact  His brace was in place  Calf compartments were soft and nontender  He was actively dorsiflexing and plantar flexing his ankle and had good palpable pulses and color and capillary refill distally      Discharge instructions:  Patient was discharged home with follow-up in 1 week  He was to use analgesic medication as needed and instructions were reviewed  Prescriptions were forwarded to his pharmacy  He was to contact me if any questions or concerns were to arise  He was to minimize activity as instructed    Precautions were reviewed, instructions provided and questions answered                 Electronically signed by Gwenette Ormond at 2/15/2019  1:12 PM

## 2019-02-26 ENCOUNTER — EVALUATION (OUTPATIENT)
Dept: PHYSICAL THERAPY | Facility: CLINIC | Age: 59
End: 2019-02-26
Payer: COMMERCIAL

## 2019-02-26 DIAGNOSIS — S82.141D CLOSED DISPLACED BICONDYLAR FRACTURE OF RIGHT TIBIA WITH ROUTINE HEALING: Primary | ICD-10-CM

## 2019-02-26 PROCEDURE — 97161 PT EVAL LOW COMPLEX 20 MIN: CPT | Performed by: PHYSICAL MEDICINE & REHABILITATION

## 2019-02-26 PROCEDURE — 97535 SELF CARE MNGMENT TRAINING: CPT | Performed by: PHYSICAL MEDICINE & REHABILITATION

## 2019-02-26 PROCEDURE — 97110 THERAPEUTIC EXERCISES: CPT | Performed by: PHYSICAL MEDICINE & REHABILITATION

## 2019-02-26 NOTE — PROGRESS NOTES
PT Evaluation     Today's date: 2019  Patient name: Saul Callahan  : 1960  MRN: [id number]  Referring provider: Paco Tirado DO  Dx:   Encounter Diagnosis     ICD-10-CM    1  Closed displaced bicondylar fracture of right tibia with routine healing S82 141D                   Assessment  Assessment details: Saul Callahan is a 63 y o  male presenting to outpatient physical therapy with diagnosis of Closed displaced bicondylar fracture of right tibia with routine healing  S/p ORIF 19  Pt is presently NBW RLE with B crutches with I-ROM brace in place for all mobility  Pt presents to OPPT for ROM, strengthening, and gait training  Patient's current impairments include pain, impaired soft tissue mobility, reduced range of motion, reduced strength, reduced postural awareness, gait abnormality and imbalance,  and reduced activity tolerance  Patient's present functional limitations include difficulty with ADLs with increased need for assistance, reliance on medication and/or modalities for pain relief, poor tolerance for functional mobility and activity with need to use AD, and difficulty completing home responsibilities  Patient to benefit from skilled outpatient physical therapy 2x/week for 4 weeks in order to reduce pain, maximize pain free range of motion, increase strength and stability, and improve functional mobility/functional activity in order to maximize return to prior level of function with reduced limitations  Thank you for your referral     Impairments: abnormal gait, abnormal muscle tone, abnormal or restricted ROM, abnormal movement, activity intolerance, impaired balance, impaired physical strength, lacks appropriate home exercise program, weight-bearing intolerance and poor posture     Symptom irritability: lowUnderstanding of Dx/Px/POC: good   Prognosis: good    Goals  STGs to be achieved in 4-6 weeks:    1   Pt will report reduced R knee pain levels "at worst" by at least 2 points (0-10 scale) in order to allow improved tolerance for functional mobility and reduced reliance on medication or modalities for pain relief  2  Pt will demonstrate improved AROM of R knee flexion and extension by at least 10-15* in order to reduce pt difficulty with gait and transfers and restore normal joint mobility  3  Pt will demonstrate improved strength of R knee grossly by at least 1/2-1 MMT in order to improve weight bearing joint stability and allow for restoration of normal joint mechanics to reduce pain and improve function  4  Pt will demonstrate reduced swelling of R knee by at least 50% in order to facilitate reduced pain and improved joint motion and function  LTGs to be achieved in 8-12 weeks:    1  Pt will be independent with HEP, demonstrating proper technique with exercises and understanding of self progression of program without need for cueing or assistance  2  Pt will report minimized pain levels with at least 75% reduction in pain since [facility]  3  Pt will demonstrate WFL AROM and strength of R hip and knee  4  Pt will demonstrate normalized gait without deviations or need for assistive device or external support  5  Pt will report return to PLOF without limitations  6  FOTO will reflect score at discharge that is greater than or equal to predicted level         Plan  Patient would benefit from: skilled physical therapy  Referral necessary: No  Planned modality interventions: cryotherapy and unattended electrical stimulation  Planned therapy interventions: manual therapy, neuromuscular re-education, balance, patient education, self care, strengthening, stretching, therapeutic exercise, gait training, functional ROM exercises, home exercise program and graded exercise  Frequency: 2x week  Duration in visits: 8  Duration in weeks: 4  Treatment plan discussed with: patient        Subjective Evaluation    History of Present Illness  Mechanism of injury: Notes about 3-4 weeks ago he was skiing when he had a "freak fall"; notes he "caught an edge" and was turned around backwards when he [place]  Noted he "felt the fx happen" with a "pop" in R knee  Noted his knee felt "off"  Pt notes he was taken to his truck and he "drove home"  Notes he called and made an Appt with Dr Alexia Rivera as he was still feeling that his knee "wasn't right"; notes an xray was done and was + for R tibial fx; pt was referred to ortho surgery  Notes he had to wait until the following Monday to  Meet with the surgeon  Notes CT scan was done  Surgery was recommended  Underwent R tibial ORIF 19; was d/c'd home 2/15  Pt notes no other tx to date  Pt f/u with surgeon end of last week; dressings removed; Referred to OPPT at this time  Pt is presenty NWB R LE and wearing a I-ROM brace  Notes he was told he may take the brace off when inactive or while sleeping  RTD in 5 weeks  Notes minimal to no pain at present  Notes he is used to being very active and has been struggling being presently immobile  Not a recurrent problem   Quality of life: good    Pain  Current pain ratin  At best pain ratin  At worst pain ratin  Location:  R knee   Quality: dull ache  Relieving factors: rest, ice and medications      Diagnostic Tests  Abnormal x-ray: routine  normal healing s/p ORIF   Treatments  Current treatment: physical therapy  Discharged from (in last 30 days): inpatient hospitalization  Patient Goals  Patient goals for therapy: decreased edema, improved balance, decreased pain, increased motion, return to sport/leisure activities, independence with ADLs/IADLs and increased strength          Objective     Observations     Right Knee   Positive for atrophy, edema and incision  Additional Observation Details  Incision anterior R knee, closed with glue   No present openings or sings/sx of infection; will monitor   + Residual bruising noted proximal R knee, > posterior  Raised region medial R tibia which pt notes is "sore like a bruise" and has been "itchy" since surgery; improving per pt ; will monitor   Mild swelling noted R knee/proximal leg  MM atrophy noted R quad/VMO  Will cont to assess     Tenderness     Additional Tenderness Details  Mild ttp medial tibial plateau region  No other ttp noted      Neurological Testing     Sensation     Knee   Left Knee   Intact: light touch    Right Knee   Intact: light touch     Additional Neurological Details  Notes region of impaired sensation proximal to R knee; greater in region of proximal anterior-lateral leg     Active Range of Motion   Left Knee   Normal active range of motion    Right Knee   Flexion: 95 degrees   Extension: -20 degrees     Additional Active Range of Motion Details  Notes stiffness/stretching at end ranges; denies pain       Passive Range of Motion     Additional Passive Range of Motion Details  TBA upcoming       Mobility   Patellar Mobility:     Right Knee   Hypomobile: medial, lateral, superior and inferior     Strength/Myotome Testing     Left Knee   Normal strength    Additional Strength Details  R TBA upcoming  + mm atrophy R quad/VMO with reduced mm contraction and tone noted with QS   + Extensor lag (moderate) with SLR flexion supine with need for Mod A to complete   Will cont to assess    R hip MMT grossly 4-4+/5  R ankle grossly 4+/5     Tests     Additional Tests Details  + mild-moderate R HS and Calf mm tightness       Swelling     Right Knee Girth Measurement (cm)   Joint line: 46 5 cm  10 cm above joint line: 48 cm  10 cm below joint line: 41 cm    Ambulation     Ambulation: Level Surfaces   Ambulation with assistive device: independent    Additional Level Surfaces Ambulation Details  NWB R LE   2 point gait with B crutches   I-ROM brace in place for all mobility; brace locked 90-20 degrees into PT  Will cont to assess           Precautions: NWB RLE, brace for all mobility    Re-eval Date: 3/26/19    Date 2/26/19 Visit Count 1       FOTO 2/26       Pain In 0/10       Pain Out 0/10           Daily Treatment Diary     Manual  2/26                                               Upcoming- R knee PROM to tolerance  R patellar mobs     Exercise Diary  2/26       Nustkyra ABDALLAOM         R HS stretch 2x30"       R calf stretch 2x30"        R heel slides 5x 10"       R static knee extension stretch  5x 10"        R quad sets 5x/5"        R SLRs (A for flexion)  Reviewed seated hip flexion (march)        HS curls         SAQ-->LAQ         Clamshells        90/90 SL hip abd        R ankle 4 way with tband Reviewed R ankle pumps/AROM                                                                           Modalities  2/26        prn                         2/26 - HEP was issued and reviewed this date for above noted exercises  Pt demonstrated understanding without incident and without questions/concerns  Will continue to update upcoming

## 2019-03-01 ENCOUNTER — OFFICE VISIT (OUTPATIENT)
Dept: PHYSICAL THERAPY | Facility: CLINIC | Age: 59
End: 2019-03-01
Payer: COMMERCIAL

## 2019-03-01 DIAGNOSIS — S82.141D CLOSED DISPLACED BICONDYLAR FRACTURE OF RIGHT TIBIA WITH ROUTINE HEALING: Primary | ICD-10-CM

## 2019-03-01 PROCEDURE — 97110 THERAPEUTIC EXERCISES: CPT | Performed by: PHYSICAL MEDICINE & REHABILITATION

## 2019-03-01 PROCEDURE — 97140 MANUAL THERAPY 1/> REGIONS: CPT | Performed by: PHYSICAL MEDICINE & REHABILITATION

## 2019-03-01 NOTE — PROGRESS NOTES
Daily Note     Today's date: 3/1/2019  Patient name: Simeon Wheeler  : 1960  MRN: [id number]  Referring provider: Angel Parham DO  Dx:   Encounter Diagnosis     ICD-10-CM    1  Closed displaced bicondylar fracture of right tibia with routine healing S82 141D                 Precautions: NWB RLE, brace for all mobility    Re-eval Date: 3/26/19    Date 2/26/19 3/1      Visit Count 1 2      FOTO        Pain In 010 1/10       Pain Out 0/10 0/10        Subjective: Pt notes no new sx/complaints  Notes his knee is "just a little achy" with the exercises  Notes his L hip has been "sore' 2* increased WB/activity/use  Denies any elevated pain or swelling R knee  Objective: See treatment diary below      Assessment: Tolerated treatment well  Pt demonstrates improved R knee AAROM/PROM this date from [facility]; -5*-110* with stiff end feels with creep  Cont with atrophy of R quad with difficulty noted eliciting VOR contraction with mm "quivering noted"  Denied any pain/sx with or following program  Notes cont numbness in region of proximal lateral knee/lower leg  No complaints end of tx  Pt is maintaining NWB R LW with knee brace  HEP reviewed  Patient demonstrated fatigue post treatment and would benefit from continued PT      Plan: Continue per plan of care  Progress treatment as tolerated        Daily Treatment Diary     Manual  2/26 3/1        15'                                        R knee PROM to tolerance flex/ext   R patellar mobs     Exercise Diary  2/26 3/1      Nustep AAROM         R HS stretch 2x30" 4x30"      R calf stretch 2x30"  4x30"       R heel slides 5x 10" 10x/15"       R static knee extension stretch  5x 10"  2x10 /10" heel on roll with QS       R quad sets 5x/5"        R SLRs (A for flexion)  Reviewed seated hip flexion (march)  Mod A for flexion; I add,abd,ext  2x10 ea       HS curls   HS isometrics ~40* flex  2x10/5"       SAQ-->LAQ   NV SAQ       Clamshells        90/90 SL hip abd R ankle 4 way with tband Reviewed R ankle pumps/AROM Green 20x ea supine                                                                           Modalities  2/26        prn Deferred to home

## 2019-03-05 ENCOUNTER — OFFICE VISIT (OUTPATIENT)
Dept: PHYSICAL THERAPY | Facility: CLINIC | Age: 59
End: 2019-03-05
Payer: COMMERCIAL

## 2019-03-05 DIAGNOSIS — S82.141D CLOSED DISPLACED BICONDYLAR FRACTURE OF RIGHT TIBIA WITH ROUTINE HEALING: Primary | ICD-10-CM

## 2019-03-05 PROCEDURE — 97110 THERAPEUTIC EXERCISES: CPT

## 2019-03-05 NOTE — PROGRESS NOTES
Daily Note     Today's date: 3/5/2019  Patient name: Zakiya Feliciano  : 1960  MRN: [id number]  Referring provider: Griselda Alves DO  Dx:   Encounter Diagnosis     ICD-10-CM    1  Closed displaced bicondylar fracture of right tibia with routine healing S82 141D        Start Time: 0800  Stop Time: 0900  Total time in clinic (min): 60 minutes  Precautions: NWB RLE, brace for all mobility    Re-eval Date: 3/26/19    Date 2/26/19 3/1 3/5     Visit Count 1 2 3     FOTO        Pain In 0/10 1/10       Pain Out 0/10 0/10        Subjective: "I am getting depressed since this injury  I thought I would be skiing all season and I am scared about getting old and hurt "      Objective: See treatment diary below      Assessment: Tolerated treatment well  Pt able to complete SLR actively, initial assistance needed to initiate motion  Pt lacks TKE at this time  Pt complaint with HEP  Patient demonstrated fatigue post treatment and would benefit from continued PT      Plan: Continue per plan of care  Progress treatment as tolerated        Daily Treatment Diary     Manual  2/26 3/1        15'  resume                                      R knee PROM to tolerance flex/ext   R patellar mobs     Exercise Diary  2/26 3/1 3/4     Nustep AAROM         R HS stretch 2x30" 4x30" 4x30"     R calf stretch 2x30"  4x30"  4x30"     R heel slides 5x 10" 10x/15"  20x/15"     R static knee extension stretch  5x 10"  2x10 /10" heel on roll with QS  2x10 /10" heel on roll with QS      R quad sets 5x/5"   20x/5"     R SLRs (A for flexion)  Reviewed seated hip flexion (march)  Mod A for flexion; I add,abd,ext  2x10 ea  Min A to start for flex add,abd,ext  3x10 ea      HS curls   HS isometrics ~40* flex  2x10/5"  HS isometrics ~40* flex  2x10/5"      SAQ-->LAQ   NV SAQ  2x10/5"     Clamshells        90/90 SL hip abd        R ankle 4 way with tband Reviewed R ankle pumps/AROM Green 20x ea supine  Green 20x ea supine Modalities  2/26        prn Deferred to home

## 2019-03-07 ENCOUNTER — OFFICE VISIT (OUTPATIENT)
Dept: PHYSICAL THERAPY | Facility: CLINIC | Age: 59
End: 2019-03-07
Payer: COMMERCIAL

## 2019-03-07 DIAGNOSIS — S82.141D CLOSED DISPLACED BICONDYLAR FRACTURE OF RIGHT TIBIA WITH ROUTINE HEALING: Primary | ICD-10-CM

## 2019-03-07 PROCEDURE — 97110 THERAPEUTIC EXERCISES: CPT

## 2019-03-07 PROCEDURE — 97140 MANUAL THERAPY 1/> REGIONS: CPT

## 2019-03-07 NOTE — PROGRESS NOTES
Daily Note     Today's date: 3/7/2019  Patient name: Zakiya Feliciano  : 1960  MRN: [id number]  Referring provider: Griselda Alves DO  Dx:   Encounter Diagnosis     ICD-10-CM    1  Closed displaced bicondylar fracture of right tibia with routine healing S82 141D        Start Time: 8178  Stop Time: 1005  Total time in clinic (min): 70 minutes  Precautions: NWB RLE, brace for all mobility    Re-eval Date: 3/26/19    Date 2/26/19 3/1 3/5 3/7    Visit Count 1 2 3 4    FOTO        Pain In 010 1/10       Pain Out 0/10 0/10        Subjective: "I don't sleep with the brace on and I don't wear it if I'm laying on the couch "      Objective: See treatment diary below      Assessment: Tolerated treatment well  Pt able to complete 4 way hip SLRs actively without assistance to initiate  Improved knee flexion and extension, end range pain noted  Patient demonstrated fatigue post treatment and would benefit from continued PT      Plan: Continue per plan of care  Progress treatment as tolerated        Daily Treatment Diary     Manual  2/26 3/1  3/7      15'  resume 15'                                     R knee PROM to tolerance flex/ext   R patellar mobs     Exercise Diary  2/26 3/1 3/4 3/7    Nustep AAROM         R HS stretch 2x30" 4x30" 4x30" 4x30"    R calf stretch 2x30"  4x30"  4x30" 4x30"    R heel slides 5x 10" 10x/15"  20x/15" 30x/15"    R static knee extension stretch  5x 10"  2x10 /10" heel on roll with QS  2x10 /10" heel on roll with QS  3x10 /10" heel on roll with QS     R quad sets 5x/5"   20x/5" 30x/5"    R SLRs (A for flexion)  Reviewed seated hip flexion (march)  Mod A for flexion; I add,abd,ext  2x10 ea  Min A to start for flex add,abd,ext  3x10 ea  AROM 4way  3x10 ea     HS curls   HS isometrics ~40* flex  2x10/5"  HS isometrics ~40* flex  2x10/5"  HS isometrics ~40* flex  3x10/5"     SAQ-->LAQ   NV SAQ  2x10/5" SAQ 2x10/5"    Yuridia        90/90 SL hip abd        R ankle 4 way with tband Reviewed R ankle pumps/AROM Green 20x ea supine  Green 20x ea supine Green 30x ea supine                                                                        Modalities  2/26        prn Deferred to home

## 2019-03-12 ENCOUNTER — OFFICE VISIT (OUTPATIENT)
Dept: PHYSICAL THERAPY | Facility: CLINIC | Age: 59
End: 2019-03-12
Payer: COMMERCIAL

## 2019-03-12 DIAGNOSIS — S82.141D CLOSED DISPLACED BICONDYLAR FRACTURE OF RIGHT TIBIA WITH ROUTINE HEALING: Primary | ICD-10-CM

## 2019-03-12 PROCEDURE — 97140 MANUAL THERAPY 1/> REGIONS: CPT | Performed by: PHYSICAL MEDICINE & REHABILITATION

## 2019-03-12 PROCEDURE — 97110 THERAPEUTIC EXERCISES: CPT | Performed by: PHYSICAL MEDICINE & REHABILITATION

## 2019-03-12 NOTE — PROGRESS NOTES
Daily Note     Today's date: 3/12/2019  Patient name: Sera Emery  : 1960  MRN: [id number]  Referring provider: Alejandro Winslow DO  Dx:   Encounter Diagnosis     ICD-10-CM    1  Closed displaced bicondylar fracture of right tibia with routine healing S82 141D                 Precautions: NWB RLE, brace for all mobility    Re-eval Date: 3/26/19    Date 2/26/19 3/1 3/5 3/7 3/12   Visit Count 1 2 3 4 5   FOTO        Pain In 0/10 110    0/10   Pain Out 0/10 010   010     Subjective: Pt notes no new sx/complaints  Reports frustration with cont immobility and inability to 'exercise"/walk or WB on R LE  Denies pain  Notes L hip remains tight/sore from increased WB  Notes he "wishes his appointment was sooner" (with MD) as he is anxious to be walking again  Objective: See treatment diary below      Assessment: Tolerated treatment well  Remains NWB R LE  Progressed exercise program to tolerance with addition of weight/reps as tolerated without incident  Improving R hip and quad strength noted  Improved quad/VMO tone R LE; continues with weakness R quad and reduced mm tone noted overall  Improving AAROM/PROM R knee; flexion 108, ext -6; stiffness at end ranges with creep noted  No complaints end of tx  Updated and issued HEP this date  Patient demonstrated fatigue post treatment and would benefit from continued PT      Plan: Continue per plan of care  Progress treatment as tolerated  Progress treament per protocol       Daily Treatment Diary     Manual  2/26 3/1  3/7 3/12     15'  resume 15' 15'                                    R knee PROM to tolerance flex/ext   R patellar mobs     Exercise Diary  2/26 3/1 3/4 3/7 3/12   Nustep AAROM         R HS stretch 2x30" 4x30" 4x30" 4x30" 4x30"   R calf stretch 2x30"  4x30"  4x30" 4x30" 4x30"   R heel slides 5x 10" 10x/15"  20x/15" 30x/15" 10x/10"   R static knee extension stretch  5x 10"  2x10 /10" heel on roll with QS  2x10 /10" heel on roll with QS 3x10 /10" heel on roll with QS  3x10 /10" heel on roll with QS    R quad sets 5x/5"   20x/5" 30x/5" 30x/5"   R SLRs (A for flexion)  Reviewed seated hip flexion (march)  Mod A for flexion; I add,abd,ext  2x10 ea  Min A to start for flex add,abd,ext  3x10 ea  AROM 4way  3x10 ea  AROM 4way  3x10 ea 1#   HS curls   HS isometrics ~40* flex  2x10/5"  HS isometrics ~40* flex  2x10/5"  HS isometrics ~40* flex  3x10/5"  HS isometrics ~40* flex  3x10/5"    SAQ-->LAQ   NV SAQ  2x10/5" SAQ 2x10/5" SAQ 3x10/5"  1#   Clamshells        90/90 SL hip abd        R ankle 4 way with tband Reviewed R ankle pumps/AROM Green 20x ea supine  Green 20x ea supine Green 30x ea supine         Hip flexor/quad stretch EOT  4x30"                                                               Modalities  2/26        prn Deferred to home    deferred

## 2019-03-14 ENCOUNTER — OFFICE VISIT (OUTPATIENT)
Dept: PHYSICAL THERAPY | Facility: CLINIC | Age: 59
End: 2019-03-14
Payer: COMMERCIAL

## 2019-03-14 DIAGNOSIS — S82.141D CLOSED DISPLACED BICONDYLAR FRACTURE OF RIGHT TIBIA WITH ROUTINE HEALING: Primary | ICD-10-CM

## 2019-03-14 PROCEDURE — 97110 THERAPEUTIC EXERCISES: CPT | Performed by: PHYSICAL MEDICINE & REHABILITATION

## 2019-03-14 PROCEDURE — 97140 MANUAL THERAPY 1/> REGIONS: CPT | Performed by: PHYSICAL MEDICINE & REHABILITATION

## 2019-03-14 NOTE — PROGRESS NOTES
Daily Note     Today's date: 3/14/2019  Patient name: Kallie Bernabe  : 1960  MRN: [id number]  Referring provider: Maria Teresa Dang DO  Dx:   Encounter Diagnosis     ICD-10-CM    1  Closed displaced bicondylar fracture of right tibia with routine healing S82 141D                 Precautions: NWB RLE, brace for all mobility    Re-eval Date: 3/26/19    Date 3/14       Visit Count 6       FOTO        Pain In 0/10       Pain Out 0/10         Subjective: Pt notes no new sx/complaints  Continues to deny pain  Notes cont'd eagerness for upcoming MD f/u and to be able to walk/WB  Compliant with Hep without questions/concerns  Objective: See treatment diary below      Assessment: Tolerated treatment well  Added LAQ and HS curls with light resistance without incident  Continues to demonstrate improving R knee ROM; 111* flex, -4* ext AROM this date; PROM approaching WFL; stiff end feel with creep  Improving R quad strength and mm tone noted  Progressing well with program  Remains NWB RLE  Patient demonstrated fatigue post treatment and would benefit from continued PT      Plan: Continue per plan of care  Progress treatment as tolerated        Daily Treatment Diary     Manual  3/14        15'                                        R knee PROM to tolerance flex/ext   R patellar mobs     Exercise Diary  3/14       Nustep AAROM         R HS stretch 4x30"       R calf stretch 4x30"       R heel slides 15x/10"       R static knee extension stretch  3x10 /10" heel on roll with QS        R quad sets        R SLRs (A for flexion)  AROM 4way  3x10 ea 1#       HS curls  Standing 1#  3x10        SAQ-->LAQ  SAQ 3x10/5"  1#    LAQ 1#  3x10        Clamshells        90/90 SL hip abd        R ankle 4 way with tband         Hip flexor/quad stretch EOT  4x30"                                                                   Modalities  3/14        deferred

## 2019-03-19 ENCOUNTER — OFFICE VISIT (OUTPATIENT)
Dept: PHYSICAL THERAPY | Facility: CLINIC | Age: 59
End: 2019-03-19
Payer: COMMERCIAL

## 2019-03-19 DIAGNOSIS — S82.141D CLOSED DISPLACED BICONDYLAR FRACTURE OF RIGHT TIBIA WITH ROUTINE HEALING: Primary | ICD-10-CM

## 2019-03-19 PROCEDURE — 97110 THERAPEUTIC EXERCISES: CPT

## 2019-03-19 PROCEDURE — 97140 MANUAL THERAPY 1/> REGIONS: CPT

## 2019-03-19 NOTE — PROGRESS NOTES
Daily Note     Today's date: 3/19/2019  Patient name: Moni Broussard  : 1960  MRN: [id number]  Referring provider: Ariadna Rosas DO  Dx:   Encounter Diagnosis     ICD-10-CM    1  Closed displaced bicondylar fracture of right tibia with routine healing S82 141D        Start Time: 213  Stop Time: 0900  Total time in clinic (min): 65 minutes  Precautions: NWB RLE, brace for all mobility    Re-eval Date: 3/26/19    Date 3/14 3/19      Visit Count 6 7      FOTO        Pain In 0/10       Pain Out 0/10         Subjective: "I feel more stiff today  I have been doing my exercises at home and pushing myself "      Objective: See treatment diary below      Assessment: Tolerated treatment well  Pt has be improving well with therapy  Pt RTD 3/28/19 with hopes of WB status change to progress with therapy  Patient demonstrated fatigue post treatment, exhibited good technique with therapeutic exercises and would benefit from continued PT      Plan: Continue per plan of care  Progress treatment as tolerated        Daily Treatment Diary     Manual  3/14 3/19       15' 15                                       R knee PROM to tolerance flex/ext   R patellar mobs     Exercise Diary  3/14 3/19      Nustep AAROM         R HS stretch 4x30" 4x30"      R calf stretch 4x30" 4x30"      R heel slides 15x/10" 20x/10"      R static knee extension stretch  3x10 /10" heel on roll with QS  3x10 /10" heel on roll with QS       R quad sets        R SLRs (A for flexion)  AROM 4way  3x10 ea 1# AROM 4way  3x10 ea 1#      HS curls  Standing 1#  3x10  Standing 1#  3x10       SAQ-->LAQ  SAQ 3x10/5"  1#    LAQ 1#  3x10  SAQ 3x10/5"  1#    LAQ 1#  3x10       Clamshells  Blue 30x/5"      90/90 SL hip abd        R ankle 4 way with tband         Hip flexor/quad stretch EOT  4x30" Hip flexor/quad stretch EOT  4x30"                                                                  Modalities  3/14        deferred

## 2019-03-21 ENCOUNTER — OFFICE VISIT (OUTPATIENT)
Dept: PHYSICAL THERAPY | Facility: CLINIC | Age: 59
End: 2019-03-21
Payer: COMMERCIAL

## 2019-03-21 DIAGNOSIS — S82.141D CLOSED DISPLACED BICONDYLAR FRACTURE OF RIGHT TIBIA WITH ROUTINE HEALING: Primary | ICD-10-CM

## 2019-03-21 PROCEDURE — 97110 THERAPEUTIC EXERCISES: CPT

## 2019-03-21 PROCEDURE — 97140 MANUAL THERAPY 1/> REGIONS: CPT

## 2019-03-21 NOTE — PROGRESS NOTES
Daily Note     Today's date: 3/21/2019  Patient name: Vince Meier  : 1960  MRN: [id number]  Referring provider: Author Terra DO  Dx:   Encounter Diagnosis     ICD-10-CM    1  Closed displaced bicondylar fracture of right tibia with routine healing S82 141D        Start Time: 7395  Stop Time: 2966  Total time in clinic (min): 70 minutes  Precautions: NWB RLE, brace for all mobility    Re-eval Date: 3/26/19    Date 3/14 3/19 3/21     Visit Count 6 7 8     FOTO        Pain In 0/10       Pain Out 0/10         Subjective: "I am noticing it more stiff over the past couple of days  It's not painful, more stiff "      Objective: See treatment diary below      Assessment: Tolerated treatment well  Pt able to increase weight and decrease repetitions without incident  Instructed patient on scar massage to breakdown scar tissue to help with  motion  All dead skin cells and remaining surgical glue was removed  Incision appears closed without drainage  Patient demonstrated fatigue post treatment, exhibited good technique with therapeutic exercises and would benefit from continued PT      Plan: Continue per plan of care  Progress treatment as tolerated        Daily Treatment Diary     Manual  3/14 3/19 3/21      15' 15 15'                                      R knee PROM to tolerance flex/ext   R patellar mobs     Exercise Diary  3/14 3/19 3/21     Nustkyra ABDALLAOM         R HS stretch 4x30" 4x30" 4x30"     R calf stretch 4x30" 4x30" 4x30"     R heel slides 15x/10" 20x/10" 30x/10"     R static knee extension stretch  3x10 /10" heel on roll with QS  3x10 /10" heel on roll with QS  3x10 /10" heel on roll with QS      R quad sets        R SLRs (A for flexion)  AROM 4way  3x10 ea 1# AROM 4way  3x10 ea 1# 2# 2x10 ea     HS curls  Standing 1#  3x10  Standing 1#  3x10  Standing 2#  2x10     SAQ-->LAQ  SAQ 3x10/5"  1#    LAQ 1#  3x10  SAQ 3x10/5"  1#    LAQ 1#  3x10  SAQ 2x10/5"  2#    LAQ 2#  2x10      Clamshells  Blue 30x/5" Blue 30x/5"     90/90 SL hip abd   30x/5"     R ankle 4 way with tband         Hip flexor/quad stretch EOT  4x30" Hip flexor/quad stretch EOT  4x30"                                                                  Modalities  3/14        deferred

## 2019-03-26 ENCOUNTER — EVALUATION (OUTPATIENT)
Dept: PHYSICAL THERAPY | Facility: CLINIC | Age: 59
End: 2019-03-26
Payer: COMMERCIAL

## 2019-03-26 DIAGNOSIS — S82.141D CLOSED DISPLACED BICONDYLAR FRACTURE OF RIGHT TIBIA WITH ROUTINE HEALING: Primary | ICD-10-CM

## 2019-03-26 PROCEDURE — 97110 THERAPEUTIC EXERCISES: CPT | Performed by: PHYSICAL MEDICINE & REHABILITATION

## 2019-03-26 PROCEDURE — 97140 MANUAL THERAPY 1/> REGIONS: CPT | Performed by: PHYSICAL MEDICINE & REHABILITATION

## 2019-03-26 NOTE — PROGRESS NOTES
PT Re-Evaluation     Today's date: 3/26/2019  Patient name: Anat Long  : 1960  MRN: [id number]  Referring provider: Chaim Flor DO  Dx:   Encounter Diagnosis     ICD-10-CM    1  Closed displaced bicondylar fracture of right tibia with routine healing S82 141D                   Assessment  Assessment details: Anat Long is a 63 y o  male presenting to outpatient physical therapy with diagnosis of Closed displaced bicondylar fracture of right tibia with routine healing  S/p ORIF 19  Pt is presently NBW RLE with B crutches with I-ROM brace in place for all mobility  Pt presents to OPPT for ROM, strengthening, and gait training  Pt has been compliant with OPPT to date, attending 9 total tx sessions  Pt has made good progress to date with reduced pain and swelling, increased ROM R knee, improved strength R knee and LE, and improved functional mobility (NWB RLE)  FOTO score has demonstrated improvement since [facility]  Pt is I with HEP and is eager to being WB and walking  RTD this Thursday for f/u  Patient's current impairments include cont'd impaired soft tissue mobility and reduced range of motion R knee, reduced strength R hip/knee, reduced postural awareness, gait abnormality and imbalance (NWB RLE),  and reduced functional activity tolerance for walking with WB restrictions  Patient's present functional limitations include difficulty with ADLs with increased need for assistance, reliance on medication and/or modalities for pain relief, poor tolerance for functional mobility and activity with need to use AD, and difficulty completing home responsibilities  Patient to benefit from cont'd skilled outpatient physical therapy 2x/week for 4 weeks in order to reduce pain, maximize pain free range of motion, increase strength and stability, and improve functional mobility/functional activity in order to maximize return to prior level of function with reduced limitations   Will progress with WB when cleared by surgeon and as tolerated  Thank you for your referral     Impairments: abnormal gait, abnormal muscle tone, abnormal or restricted ROM, abnormal movement, activity intolerance, impaired balance, impaired physical strength, lacks appropriate home exercise program, weight-bearing intolerance and poor posture     Symptom irritability: lowUnderstanding of Dx/Px/POC: good   Prognosis: good    Goals  STGs to be achieved in 4-6 weeks:    1  Pt will report reduced R knee pain levels "at worst" by at least 2 points (0-10 scale) in order to allow improved tolerance for functional mobility and reduced reliance on medication or modalities for pain relief  - met   2  Pt will demonstrate improved AROM of R knee flexion and extension by at least 10-15* in order to reduce pt difficulty with gait and transfers and restore normal joint mobility - Met for flexion    3  Pt will demonstrate improved strength of R knee grossly by at least 1/2-1 MMT in order to improve weight bearing joint stability and allow for restoration of normal joint mechanics to reduce pain and improve function  - partially met, progressing   4  Pt will demonstrate reduced swelling of R knee by at least 50% in order to facilitate reduced pain and improved joint motion and function  - MET  5  Pt to demonstrate full extension R knee without pain/sx  LTGs to be achieved in 8-12 weeks:    1  Pt will be independent with HEP, demonstrating proper technique with exercises and understanding of self progression of program without need for cueing or assistance  - met   2  Pt will report minimized pain levels with at least 75% reduction in pain since [facility]  - met   3  Pt will demonstrate WFL AROM and strength of R hip and knee  - progressing   4  Pt will demonstrate normalized gait without deviations or need for assistive device or external support  - progressing   5  Pt will report return to PLOF without limitations  - progressing   6   FOTO will reflect score at discharge that is greater than or equal to predicted level  - progressing       Plan  Patient would benefit from: skilled physical therapy  Referral necessary: No  Planned modality interventions: cryotherapy and unattended electrical stimulation  Planned therapy interventions: manual therapy, neuromuscular re-education, balance, patient education, self care, strengthening, stretching, therapeutic exercise, gait training, functional ROM exercises, home exercise program and graded exercise  Frequency: 2x week  Duration in visits: 8  Duration in weeks: 4  Treatment plan discussed with: patient        Subjective Evaluation    History of Present Illness  Mechanism of injury: Pt notes overall progress since [facility]  Notes over the past week, his R LE is "normal" and he is "ready to walk"  Feels improved ROM R knee and improved strength R knee/LE  Notes he has not had any pain or swelling in R knee  Notes he has been taking the brace off at home more and feels since, his knee has been moving better  Remains compliant with HEP without questions/concerns  No new sx/complaints  RTD this Thurs and is anxious to be cleared to WB/walk  Not a recurrent problem   Quality of life: good    Pain  No pain reported  Current pain ratin  At best pain ratin  At worst pain ratin  Location:  R knee       Diagnostic Tests  Abnormal x-ray: routine  normal healing s/p ORIF   Treatments  Current treatment: physical therapy  Patient Goals  Patient goals for therapy: decreased edema, improved balance, decreased pain, increased motion, return to sport/leisure activities, independence with ADLs/IADLs and increased strength          Objective     Observations     Right Knee   Positive for atrophy and incision  Negative for edema  Additional Observation Details  Incision anterior R knee, closed with glue   No present openings or sings/sx of infection; will monitor - healing well since [facility], remains with no signs/sx of infections; areas of increased scar tissue superior incision, addressing with gentle STM which pt is tolerating well   + Residual bruising noted proximal R knee, > posterior- resolved   Raised region medial R tibia which pt notes is "sore like a bruise" and has been "itchy" since surgery; improving per pt ; will monitor - minimal at present per pt, improving   Mild swelling noted R knee/proximal leg- resolved   MM atrophy noted R quad/VMO- improving however less tone persists R vs L   Will cont to assess     Tenderness     Additional Tenderness Details  Mild ttp medial tibial plateau region- resolved   No other ttp noted      Neurological Testing     Sensation     Knee   Left Knee   Intact: light touch    Right Knee   Intact: light touch     Additional Neurological Details  Notes region of impaired sensation proximal to R knee; greater in region of proximal anterior-lateral leg - resolved    Active Range of Motion   Left Knee   Normal active range of motion    Right Knee   Flexion: 112 degrees   Extension: -10 degrees     Additional Active Range of Motion Details  Notes stiffness/stretching at end ranges; denies pain       Passive Range of Motion     Right Knee   Flexion: 116 degrees   Extension: -8 degrees     Additional Passive Range of Motion Details  Stiff end feel with creep into flexion ; stiff end feel with limited creep into extension       Mobility   Patellar Mobility:     Right Knee   WFL: medial, lateral, superior and inferior    Strength/Myotome Testing     Left Knee   Normal strength    Right Knee   Flexion: 4  Extension: 4  Quadriceps contraction: fair    Additional Strength Details  R MMT screen completed in available ROM to tolerance   + mm atrophy R quad/VMO with reduced mm contraction and tone noted with QS - improving   + Extensor lag (moderate) with SLR flexion supine with need for Mod A to complete - resolved , I SLR without lag noted   Will cont to assess    R hip MMT grossly 4-4+/5 -no pain  R ankle grossly 4+-5/5 - no pain    Tests     Additional Tests Details  + mild-moderate R HS and Calf mm tightness - improved     Swelling     Right Knee Girth Measurement (cm)   Joint line: 46 5 cm  10 cm above joint line: 48 cm  10 cm below joint line: 41 cm    Ambulation     Ambulation: Level Surfaces   Ambulation with assistive device: independent    Additional Level Surfaces Ambulation Details  NWB R LE   2 point gait with B crutches   I-ROM brace in place for all mobility; brace locked 90-20 degrees into PT  Will cont to assess - continues since IE   RTD this Thursday     General Comments:      Knee Comments  No swelling noted at re-eval       Precautions: NWB RLE, brace for all mobility    Re-eval Date: 3/26/19    Date 3/14 3/19 3/21 3/26    Visit Count 6 7 8 9    FOTO    3/26    Pain In 0/10   0/10    Pain Out 0/10   0/10          Daily Treatment Diary     Manual  3/14 3/19 3/21 3/26     15' 15 15' 15'                                     R knee PROM to tolerance flex/ext   R patellar mobs     Exercise Diary  3/14 3/19 3/21 3/26    Nustep AAROM         R HS stretch 4x30" 4x30" 4x30" 4x30"    R calf stretch 4x30" 4x30" 4x30" 4x30"    R heel slides 15x/10" 20x/10" 30x/10" 15x/10"    R static knee extension stretch  3x10 /10" heel on roll with QS  3x10 /10" heel on roll with QS  3x10 /10" heel on roll with QS  15x/10"     R quad sets        R SLRs (A for flexion)  AROM 4way  3x10 ea 1# AROM 4way  3x10 ea 1# 2# 2x10 ea 2# 3x10 ea    HS curls  Standing 1#  3x10  Standing 1#  3x10  Standing 2#  2x10 Standing 2#  3x10    SAQ-->LAQ  SAQ 3x10/5"  1#    LAQ 1#  3x10  SAQ 3x10/5"  1#    LAQ 1#  3x10  SAQ 2x10/5"  2#    LAQ 2#  2x10  LAQ 2#  3x10     Clamshells  Blue 30x/5" Blue 30x/5" Blue 30x/5"    90/90 SL hip abd   30x/5" Blue 30x/5"    R ankle 4 way with tband         Hip flexor/quad stretch EOT  4x30" Hip flexor/quad stretch EOT  4x30"  Hip flexor/quad stretch EOT  4x30" Modalities  3/14        deferred   deferred

## 2019-03-28 ENCOUNTER — APPOINTMENT (OUTPATIENT)
Dept: RADIOLOGY | Facility: CLINIC | Age: 59
End: 2019-03-28
Payer: COMMERCIAL

## 2019-03-28 ENCOUNTER — OFFICE VISIT (OUTPATIENT)
Dept: OBGYN CLINIC | Facility: CLINIC | Age: 59
End: 2019-03-28

## 2019-03-28 ENCOUNTER — APPOINTMENT (OUTPATIENT)
Dept: PHYSICAL THERAPY | Facility: CLINIC | Age: 59
End: 2019-03-28
Payer: COMMERCIAL

## 2019-03-28 VITALS
BODY MASS INDEX: 35.25 KG/M2 | SYSTOLIC BLOOD PRESSURE: 167 MMHG | HEART RATE: 99 BPM | HEIGHT: 73 IN | WEIGHT: 266 LBS | DIASTOLIC BLOOD PRESSURE: 97 MMHG

## 2019-03-28 DIAGNOSIS — S82.141D CLOSED FRACTURE OF RIGHT TIBIAL PLATEAU WITH ROUTINE HEALING: ICD-10-CM

## 2019-03-28 DIAGNOSIS — S82.141D CLOSED FRACTURE OF RIGHT TIBIAL PLATEAU WITH ROUTINE HEALING: Primary | ICD-10-CM

## 2019-03-28 PROCEDURE — 99024 POSTOP FOLLOW-UP VISIT: CPT | Performed by: ORTHOPAEDIC SURGERY

## 2019-03-28 PROCEDURE — 73560 X-RAY EXAM OF KNEE 1 OR 2: CPT

## 2019-03-28 NOTE — PROGRESS NOTES
Patient Name:  Juan F Flores  MRN:  [id number]    Assessment     1  Closed fracture of right tibial plateau with routine healing  XR knee 1 or 2 vw right       Plan     1  I would recommend follow-up in 6 weeks  The brace was increased to allow full range of motion within the brace is limits  He is allowed weight-bearing as tolerated  He had questions regarding activity level  I would allow him to increase his walking as tolerated  Chores around the house could be performed but he should not perform any strenuous tasks  He asked about mountain biking or biking on trails  I recommended he not pursue that activity at this point time  He is to contact me if questions or concerns arise  Physical therapy should continue working on strengthening, range of motion and weight-bearing as tolerated ambulation  No follow-ups on file  Subjective   Juan F Flores returns for follow-up of right tibial plateau fracture  The patient is 6 week(s) post ORIF and returns for routine follow-up  Patient Denies any significant pain  He is anxious to increase activity level  He attends physical therapy on a routine basis  Objective     /97 (BP Location: Left arm, Patient Position: Sitting, Cuff Size: Large)   Pulse 99   Ht 6' 1" (1 854 m)   Wt 121 kg (266 lb)   BMI 35 09 kg/m²     The exam demonstrates Shashi to ambulate with crutches, nonweightbearing right lower extremity upon his arrival   His brace is in place  The brace was removed without difficulty  The incision is well healed  He has full extension and flexion to about 110°  Collateral ligaments are stable  He has no complaints during range of motion or collateral testing  Cruciate ligaments are stable and no complaints or elicited during cruciate testing  There is no significant swelling  Distal sensation is intact  He has excellent strength in the right lower extremity  Calf compartments are soft and nontender    Homans sign is negative  Data Review     I have personally reviewed pertinent films in PACS demonstrating excellent progress in healing with stable fixation of his fracture  The images were reviewed with the patient

## 2019-03-29 ENCOUNTER — OFFICE VISIT (OUTPATIENT)
Dept: PHYSICAL THERAPY | Facility: CLINIC | Age: 59
End: 2019-03-29
Payer: COMMERCIAL

## 2019-03-29 DIAGNOSIS — S82.141D CLOSED DISPLACED BICONDYLAR FRACTURE OF RIGHT TIBIA WITH ROUTINE HEALING: Primary | ICD-10-CM

## 2019-03-29 PROCEDURE — 97116 GAIT TRAINING THERAPY: CPT

## 2019-03-29 PROCEDURE — 97110 THERAPEUTIC EXERCISES: CPT

## 2019-03-29 NOTE — PROGRESS NOTES
Daily Note     Today's date: 3/29/2019  Patient name: Charlotte Medina  : 1960  MRN: [id number]  Referring provider: Danny Duncan DO  Dx:   Encounter Diagnosis     ICD-10-CM    1  Closed displaced bicondylar fracture of right tibia with routine healing S82 141D        Start Time: 0800  Stop Time: 0915  Total time in clinic (min): 75 minutes  Precautions: WBAT with brace opened     Re-eval Date: 19    Date 3/14 3/19 3/21 3/26 3/29   Visit Count 6 7 8 9 10   FOTO    3/26    Pain In 0/10   0/10    Pain Out 0/10   0/10      Subjective: "I am able to walk with my crutches now but I'm not allowed to bike ride on trials "      Objective: See treatment diary below      Assessment: Tolerated treatment well  Pt upgraded to WBAT with brace unlocked and crutches  Instructed and demonstrated amb with single crutch  Initiated wt shifts and standing exercises in //bars  Pt apprehensive amb with single crutch  Appropriate strength deficits noted in R LE especially with lateral gait  Encouraged pt to use single crutch in home and bilateral crutches outside  Patient demonstrated fatigue post treatment and would benefit from continued PT      Plan: Continue per plan of care  Progress treatment as tolerated        Daily Treatment Diary     Manual  3/14 3/19 3/21 3/26 3/29    15' 15 15' 15' NV                                    R knee PROM to tolerance flex/ext   R patellar mobs     Exercise Diary  3/14 3/19 3/21 3/26 3/29   Nustep AAROM      L3 12'   R HS stretch 4x30" 4x30" 4x30" 4x30" 4x30"   R calf stretch 4x30" 4x30" 4x30" 4x30"    R heel slides 15x/10" 20x/10" 30x/10" 15x/10" 30x10"   R static knee extension stretch  3x10 /10" heel on roll with QS  3x10 /10" heel on roll with QS  3x10 /10" heel on roll with QS  15x/10"     R quad sets        R SLRs (A for flexion)  AROM 4way  3x10 ea 1# AROM 4way  3x10 ea 1# 2# 2x10 ea 2# 3x10 ea    HS curls  Standing 1#  3x10  Standing 1#  3x10  Standing 2#  2x10 Standing 2#  3x10 Stand  R firm  L foam 3x10 ea   SAQ-->LAQ  SAQ 3x10/5"  1#    LAQ 1#  3x10  SAQ 3x10/5"  1#    LAQ 1#  3x10  SAQ 2x10/5"  2#    LAQ 2#  2x10  LAQ 2#  3x10  LAQ 2#  2x15    Clamshells  Blue 30x/5" Blue 30x/5" Blue 30x/5" Blue 30x/5"   90/90 SL hip abd   30x/5" Blue 30x/5" Blue 30x/5"   R ankle 4 way with tband     Wt shifts  10-15x/5-10"  Fwd/lat    Hip flexor/quad stretch EOT  4x30" Hip flexor/quad stretch EOT  4x30"  Hip flexor/quad stretch EOT  4x30"         rhom stance  3x30"        Tandem  2x30"        sidesteping in // x4        Gaiting single crutch  15'                               Modalities  3/14        deferred   deferred

## 2019-04-02 ENCOUNTER — OFFICE VISIT (OUTPATIENT)
Dept: PHYSICAL THERAPY | Facility: CLINIC | Age: 59
End: 2019-04-02
Payer: COMMERCIAL

## 2019-04-02 DIAGNOSIS — S82.141D CLOSED DISPLACED BICONDYLAR FRACTURE OF RIGHT TIBIA WITH ROUTINE HEALING: Primary | ICD-10-CM

## 2019-04-02 PROCEDURE — 97116 GAIT TRAINING THERAPY: CPT

## 2019-04-02 PROCEDURE — 97112 NEUROMUSCULAR REEDUCATION: CPT

## 2019-04-02 PROCEDURE — 97110 THERAPEUTIC EXERCISES: CPT

## 2019-04-04 ENCOUNTER — OFFICE VISIT (OUTPATIENT)
Dept: PHYSICAL THERAPY | Facility: CLINIC | Age: 59
End: 2019-04-04
Payer: COMMERCIAL

## 2019-04-04 DIAGNOSIS — S82.141D CLOSED DISPLACED BICONDYLAR FRACTURE OF RIGHT TIBIA WITH ROUTINE HEALING: Primary | ICD-10-CM

## 2019-04-04 PROCEDURE — 97110 THERAPEUTIC EXERCISES: CPT

## 2019-04-04 PROCEDURE — 97140 MANUAL THERAPY 1/> REGIONS: CPT

## 2019-04-09 ENCOUNTER — OFFICE VISIT (OUTPATIENT)
Dept: PHYSICAL THERAPY | Facility: CLINIC | Age: 59
End: 2019-04-09
Payer: COMMERCIAL

## 2019-04-09 DIAGNOSIS — S82.141D CLOSED DISPLACED BICONDYLAR FRACTURE OF RIGHT TIBIA WITH ROUTINE HEALING: Primary | ICD-10-CM

## 2019-04-09 PROCEDURE — 97140 MANUAL THERAPY 1/> REGIONS: CPT

## 2019-04-09 PROCEDURE — 97110 THERAPEUTIC EXERCISES: CPT

## 2019-04-11 ENCOUNTER — OFFICE VISIT (OUTPATIENT)
Dept: PHYSICAL THERAPY | Facility: CLINIC | Age: 59
End: 2019-04-11
Payer: COMMERCIAL

## 2019-04-11 DIAGNOSIS — S82.141D CLOSED DISPLACED BICONDYLAR FRACTURE OF RIGHT TIBIA WITH ROUTINE HEALING: Primary | ICD-10-CM

## 2019-04-11 PROCEDURE — 97110 THERAPEUTIC EXERCISES: CPT

## 2019-04-11 PROCEDURE — 97140 MANUAL THERAPY 1/> REGIONS: CPT

## 2019-04-16 ENCOUNTER — OFFICE VISIT (OUTPATIENT)
Dept: PHYSICAL THERAPY | Facility: CLINIC | Age: 59
End: 2019-04-16
Payer: COMMERCIAL

## 2019-04-16 DIAGNOSIS — S82.141D CLOSED DISPLACED BICONDYLAR FRACTURE OF RIGHT TIBIA WITH ROUTINE HEALING: Primary | ICD-10-CM

## 2019-04-16 PROCEDURE — 97110 THERAPEUTIC EXERCISES: CPT

## 2019-04-16 PROCEDURE — 97140 MANUAL THERAPY 1/> REGIONS: CPT

## 2019-04-18 ENCOUNTER — OFFICE VISIT (OUTPATIENT)
Dept: PHYSICAL THERAPY | Facility: CLINIC | Age: 59
End: 2019-04-18
Payer: COMMERCIAL

## 2019-04-18 DIAGNOSIS — S82.141D CLOSED DISPLACED BICONDYLAR FRACTURE OF RIGHT TIBIA WITH ROUTINE HEALING: Primary | ICD-10-CM

## 2019-04-18 PROCEDURE — 97110 THERAPEUTIC EXERCISES: CPT | Performed by: PHYSICAL MEDICINE & REHABILITATION

## 2019-04-18 PROCEDURE — 97140 MANUAL THERAPY 1/> REGIONS: CPT | Performed by: PHYSICAL MEDICINE & REHABILITATION

## 2019-04-23 ENCOUNTER — OFFICE VISIT (OUTPATIENT)
Dept: PHYSICAL THERAPY | Facility: CLINIC | Age: 59
End: 2019-04-23
Payer: COMMERCIAL

## 2019-04-23 DIAGNOSIS — S82.141D CLOSED DISPLACED BICONDYLAR FRACTURE OF RIGHT TIBIA WITH ROUTINE HEALING: Primary | ICD-10-CM

## 2019-04-23 PROCEDURE — 97110 THERAPEUTIC EXERCISES: CPT | Performed by: PHYSICAL MEDICINE & REHABILITATION

## 2019-04-23 PROCEDURE — 97140 MANUAL THERAPY 1/> REGIONS: CPT | Performed by: PHYSICAL MEDICINE & REHABILITATION

## 2019-04-25 ENCOUNTER — OFFICE VISIT (OUTPATIENT)
Dept: PHYSICAL THERAPY | Facility: CLINIC | Age: 59
End: 2019-04-25
Payer: COMMERCIAL

## 2019-04-25 DIAGNOSIS — S82.141D CLOSED DISPLACED BICONDYLAR FRACTURE OF RIGHT TIBIA WITH ROUTINE HEALING: Primary | ICD-10-CM

## 2019-04-25 PROCEDURE — 97110 THERAPEUTIC EXERCISES: CPT

## 2019-04-25 PROCEDURE — 97140 MANUAL THERAPY 1/> REGIONS: CPT

## 2019-04-30 ENCOUNTER — OFFICE VISIT (OUTPATIENT)
Dept: PHYSICAL THERAPY | Facility: CLINIC | Age: 59
End: 2019-04-30
Payer: COMMERCIAL

## 2019-04-30 DIAGNOSIS — S82.141D CLOSED DISPLACED BICONDYLAR FRACTURE OF RIGHT TIBIA WITH ROUTINE HEALING: Primary | ICD-10-CM

## 2019-04-30 PROCEDURE — 97110 THERAPEUTIC EXERCISES: CPT

## 2019-04-30 PROCEDURE — 97140 MANUAL THERAPY 1/> REGIONS: CPT

## 2019-05-02 ENCOUNTER — OFFICE VISIT (OUTPATIENT)
Dept: PHYSICAL THERAPY | Facility: CLINIC | Age: 59
End: 2019-05-02
Payer: COMMERCIAL

## 2019-05-02 DIAGNOSIS — S82.141D CLOSED DISPLACED BICONDYLAR FRACTURE OF RIGHT TIBIA WITH ROUTINE HEALING: Primary | ICD-10-CM

## 2019-05-02 PROCEDURE — 97140 MANUAL THERAPY 1/> REGIONS: CPT

## 2019-05-02 PROCEDURE — 97110 THERAPEUTIC EXERCISES: CPT

## 2019-05-07 ENCOUNTER — OFFICE VISIT (OUTPATIENT)
Dept: PHYSICAL THERAPY | Facility: CLINIC | Age: 59
End: 2019-05-07
Payer: COMMERCIAL

## 2019-05-07 DIAGNOSIS — S82.141D CLOSED DISPLACED BICONDYLAR FRACTURE OF RIGHT TIBIA WITH ROUTINE HEALING: Primary | ICD-10-CM

## 2019-05-07 PROCEDURE — 97140 MANUAL THERAPY 1/> REGIONS: CPT

## 2019-05-07 PROCEDURE — 97110 THERAPEUTIC EXERCISES: CPT

## 2019-05-09 ENCOUNTER — APPOINTMENT (OUTPATIENT)
Dept: RADIOLOGY | Facility: CLINIC | Age: 59
End: 2019-05-09
Payer: COMMERCIAL

## 2019-05-09 ENCOUNTER — OFFICE VISIT (OUTPATIENT)
Dept: OBGYN CLINIC | Facility: CLINIC | Age: 59
End: 2019-05-09

## 2019-05-09 ENCOUNTER — APPOINTMENT (OUTPATIENT)
Dept: PHYSICAL THERAPY | Facility: CLINIC | Age: 59
End: 2019-05-09
Payer: COMMERCIAL

## 2019-05-09 VITALS
HEIGHT: 73 IN | SYSTOLIC BLOOD PRESSURE: 129 MMHG | WEIGHT: 268 LBS | DIASTOLIC BLOOD PRESSURE: 80 MMHG | BODY MASS INDEX: 35.52 KG/M2 | HEART RATE: 96 BPM

## 2019-05-09 DIAGNOSIS — S82.141D CLOSED FRACTURE OF RIGHT TIBIAL PLATEAU WITH ROUTINE HEALING: ICD-10-CM

## 2019-05-09 DIAGNOSIS — S82.141D CLOSED FRACTURE OF RIGHT TIBIAL PLATEAU WITH ROUTINE HEALING: Primary | ICD-10-CM

## 2019-05-09 PROCEDURE — 73560 X-RAY EXAM OF KNEE 1 OR 2: CPT

## 2019-05-09 PROCEDURE — 99024 POSTOP FOLLOW-UP VISIT: CPT | Performed by: ORTHOPAEDIC SURGERY

## 2019-05-14 ENCOUNTER — APPOINTMENT (OUTPATIENT)
Dept: PHYSICAL THERAPY | Facility: CLINIC | Age: 59
End: 2019-05-14
Payer: COMMERCIAL

## 2019-05-16 ENCOUNTER — APPOINTMENT (OUTPATIENT)
Dept: PHYSICAL THERAPY | Facility: CLINIC | Age: 59
End: 2019-05-16
Payer: COMMERCIAL

## 2019-07-18 DIAGNOSIS — S82.141D CLOSED FRACTURE OF RIGHT TIBIAL PLATEAU WITH ROUTINE HEALING: Primary | ICD-10-CM

## 2019-07-24 ENCOUNTER — TELEPHONE (OUTPATIENT)
Dept: OBGYN CLINIC | Facility: CLINIC | Age: 59
End: 2019-07-24

## 2019-07-24 ENCOUNTER — HOSPITAL ENCOUNTER (OUTPATIENT)
Dept: RADIOLOGY | Facility: HOSPITAL | Age: 59
Discharge: HOME | End: 2019-07-24
Attending: ORTHOPAEDIC SURGERY
Payer: COMMERCIAL

## 2019-07-24 VITALS
HEART RATE: 95 BPM | WEIGHT: 253 LBS | SYSTOLIC BLOOD PRESSURE: 124 MMHG | HEIGHT: 73 IN | DIASTOLIC BLOOD PRESSURE: 78 MMHG | BODY MASS INDEX: 33.53 KG/M2

## 2019-07-24 DIAGNOSIS — S82.141D CLOSED FRACTURE OF RIGHT TIBIAL PLATEAU WITH ROUTINE HEALING: ICD-10-CM

## 2019-07-24 DIAGNOSIS — S82.141D CLOSED FRACTURE OF RIGHT TIBIAL PLATEAU WITH ROUTINE HEALING: Primary | ICD-10-CM

## 2019-07-24 PROCEDURE — 73560 X-RAY EXAM OF KNEE 1 OR 2: CPT

## 2019-07-24 PROCEDURE — 99213 OFFICE O/P EST LOW 20 MIN: CPT | Performed by: ORTHOPAEDIC SURGERY

## 2019-07-24 NOTE — PROGRESS NOTES
Patient Name:  Huber London  MRN:  [id number]    Assessment     1  Closed fracture of right tibial plateau with routine healing         Plan     X-rays performed today were reviewed with the patient  The patient may resume all activities as tolerated  The patient was informed that some stiffness and soreness especially in muscle groups around the knee is normal as he resumes his normal activities  He was encouraged to take OTC analgesics as needed for the stiffness and soreness  He may ice or use moist heat as needed for additional analgesia  We will see the patient back on an as-needed basis  He was encouraged to contact the office should any questions or concerns arise  He does understand that posttraumatic arthritis may occur and if he does develop any symptoms suggestive of arthritis, re-evaluation would be warranted  Return if symptoms worsen or fail to improve  Subjective   Huber London returns for follow-up of right tibial plateau fracture  The patient is 5 5 month(s) post ORIF and returns for routine follow-up  Patient  Does not have any concerns this time  He has begun to resume some of his normal activities including mountain biking without significant pain  He does state he occasionally feels stiff and sore but that usually occurs in his quads, hips, or lower back  He states he knows this stiffness and soreness with prolonged periods of inactivity  He denies any numbness or tingling in his right lower extremity  Objective     /78   Pulse 95   Ht 6' 1" (1 854 m)   Wt 115 kg (253 lb)   BMI 33 38 kg/m²     Right knee exam:   Skin intact, incision is well healed and benign  No erythema, drainage  No significant swelling  No tenderness to palpation, including over the lateral hardware of the proximal right tibia  Patient has full active and passive knee range of motion without pain  5/5 strength to resisted knee flexion and extension   Calf compartments are soft and compressible  Motor and sensory exams are grossly intact, 2+ PTpulse  Data Review     I have personally reviewed pertinent films in PACS, and my interpretation follows  X-rays of the right knee performed today demonstrate healing tibial plateau fracture status post ORIF  No interval changes in hardware alignment since his last x-ray

## (undated) DEVICE — GLOVE INDICATOR UNDERGLOVE SZ 7.5 GREEN

## (undated) DEVICE — STAPLER SKIN 35 WIDE ULC APPOSE

## (undated) DEVICE — SUT VICRYL 0 CP-1 27 IN J267H

## (undated) DEVICE — GLOVE SRG BIOGEL 8

## (undated) DEVICE — GLOVE INDICATOR UNDERGLOVE SZ 8 GREEN

## (undated) DEVICE — CUFF TOURNIQUET 30 X 4 IN QUICK CONNECT DISP 1BLA

## (undated) DEVICE — STOCKINETTE,IMPERVIOUS,12X48,STERILE: Brand: MEDLINE

## (undated) DEVICE — GLOVE SRG BIOGEL 7.5

## (undated) DEVICE — POV-IOD SOLUTION 4OZ BT

## (undated) DEVICE — ALL PURPOSE SPONGES,NON-WOVEN, 4 PLY: Brand: CURITY

## (undated) DEVICE — GARMENT,MEDLINE,DVT,INT,CALF,FOAM,MED: Brand: MEDLINE

## (undated) DEVICE — 3M™ IOBAN™ 2 ANTIMICROBIAL INCISE DRAPE 6650EZ: Brand: IOBAN™ 2

## (undated) DEVICE — HALF SHEET: Brand: CONVERTORS

## (undated) DEVICE — FRAZIER SUCTION INSTRUMENT 18 FR W/OBTURATOR, NO CONTROL VENT: Brand: FRAZIER

## (undated) DEVICE — DRAPE STERI 1010 18IN X 12IN

## (undated) DEVICE — IMPLANTABLE DEVICE
Type: IMPLANTABLE DEVICE | Site: KNEE | Status: NON-FUNCTIONAL
Removed: 2019-02-14

## (undated) DEVICE — BANDAGE, ESMARK LF STR 4"X9'(20/CS): Brand: CYPRESS

## (undated) DEVICE — CAUTERY TIP POLISHER: Brand: DEVON

## (undated) DEVICE — 2.8MM PERCUTANEOUS DRILL BIT F/LCP® PL QC/200MM/100MM CALIB: Brand: LCP

## (undated) DEVICE — COBAN 6 IN STERILE

## (undated) DEVICE — 2.5MM DRILL BIT/QC/GOLD/110MM

## (undated) DEVICE — INTENDED FOR TISSUE SEPARATION, AND OTHER PROCEDURES THAT REQUIRE A SHARP SURGICAL BLADE TO PUNCTURE OR CUT.: Brand: BARD-PARKER ® CARBON RIB-BACK BLADES

## (undated) DEVICE — PENCIL ROCKER SWITCH CAUTERY HAND CONTROL

## (undated) DEVICE — CURITY NON-ADHERENT STRIPS: Brand: CURITY

## (undated) DEVICE — MEDI-VAC YANK SUCT HNDL W/TPRD BULBOUS TIP: Brand: CARDINAL HEALTH

## (undated) DEVICE — SPONGE LAP 18 X 18 IN STRL RFD

## (undated) DEVICE — DRAPE C-ARM X-RAY

## (undated) DEVICE — PADDING CAST 6IN COTTON STRL

## (undated) DEVICE — PAD CAST 6 IN COTTON NON STERILE

## (undated) DEVICE — ACE WRAP 6 IN STERILE

## (undated) DEVICE — NEEDLE SPINAL 18G X 3IN DISP

## (undated) DEVICE — CAST PADDING 6 IN SYNTHETIC STRL

## (undated) DEVICE — BETHLEHEM UNIVERSAL  MIONR EXT: Brand: CARDINAL HEALTH

## (undated) DEVICE — ASTOUND IMPERVIOUS SURGICAL GOWN: Brand: CONVERTORS

## (undated) DEVICE — SUT VICRYL 2-0 CP-1 27 IN J266H